# Patient Record
Sex: FEMALE | Race: WHITE | NOT HISPANIC OR LATINO | Employment: PART TIME | URBAN - METROPOLITAN AREA
[De-identification: names, ages, dates, MRNs, and addresses within clinical notes are randomized per-mention and may not be internally consistent; named-entity substitution may affect disease eponyms.]

---

## 2021-02-11 ENCOUNTER — IMMUNIZATION (OUTPATIENT)
Dept: NURSING | Facility: CLINIC | Age: 31
End: 2021-02-11
Payer: COMMERCIAL

## 2021-02-11 PROCEDURE — 91300 PR COVID VAC PFIZER DIL RECON 30 MCG/0.3 ML IM: CPT

## 2021-02-11 PROCEDURE — 0001A PR COVID VAC PFIZER DIL RECON 30 MCG/0.3 ML IM: CPT

## 2021-03-04 ENCOUNTER — IMMUNIZATION (OUTPATIENT)
Dept: NURSING | Facility: CLINIC | Age: 31
End: 2021-03-04
Payer: COMMERCIAL

## 2021-03-04 PROCEDURE — 91300 PR COVID VAC PFIZER DIL RECON 30 MCG/0.3 ML IM: CPT

## 2021-03-04 PROCEDURE — 0002A PR COVID VAC PFIZER DIL RECON 30 MCG/0.3 ML IM: CPT

## 2021-03-07 ENCOUNTER — HEALTH MAINTENANCE LETTER (OUTPATIENT)
Age: 31
End: 2021-03-07

## 2021-08-23 ENCOUNTER — HOSPITAL ENCOUNTER (EMERGENCY)
Facility: CLINIC | Age: 31
Discharge: HOME | End: 2021-08-23
Attending: NURSE PRACTITIONER | Admitting: NURSE PRACTITIONER
Payer: COMMERCIAL

## 2021-08-23 ENCOUNTER — TELEPHONE (OUTPATIENT)
Dept: FAMILY MEDICINE | Facility: CLINIC | Age: 31
End: 2021-08-23

## 2021-08-23 VITALS
HEART RATE: 73 BPM | SYSTOLIC BLOOD PRESSURE: 119 MMHG | TEMPERATURE: 98.6 F | BODY MASS INDEX: 23.7 KG/M2 | DIASTOLIC BLOOD PRESSURE: 82 MMHG | OXYGEN SATURATION: 98 % | WEIGHT: 160 LBS | HEIGHT: 69 IN | RESPIRATION RATE: 15 BRPM

## 2021-08-23 DIAGNOSIS — Z20.9 EXPOSURE TO BAT WITHOUT KNOWN BITE: ICD-10-CM

## 2021-08-23 PROCEDURE — 90675 RABIES VACCINE IM: CPT | Performed by: NURSE PRACTITIONER

## 2021-08-23 PROCEDURE — 96372 THER/PROPH/DIAG INJ SC/IM: CPT | Performed by: NURSE PRACTITIONER

## 2021-08-23 PROCEDURE — 99284 EMERGENCY DEPT VISIT MOD MDM: CPT | Mod: 25

## 2021-08-23 PROCEDURE — 90375 RABIES IG IM/SC: CPT | Performed by: NURSE PRACTITIONER

## 2021-08-23 PROCEDURE — 90471 IMMUNIZATION ADMIN: CPT | Performed by: NURSE PRACTITIONER

## 2021-08-23 PROCEDURE — 250N000011 HC RX IP 250 OP 636: Performed by: NURSE PRACTITIONER

## 2021-08-23 RX ORDER — METHYLPREDNISOLONE SODIUM SUCCINATE 125 MG/2ML
125 INJECTION, POWDER, LYOPHILIZED, FOR SOLUTION INTRAMUSCULAR; INTRAVENOUS
Status: CANCELLED
Start: 2021-08-23

## 2021-08-23 RX ORDER — ALBUTEROL SULFATE 0.83 MG/ML
2.5 SOLUTION RESPIRATORY (INHALATION)
Status: CANCELLED | OUTPATIENT
Start: 2021-08-23

## 2021-08-23 RX ORDER — ALBUTEROL SULFATE 90 UG/1
1-2 AEROSOL, METERED RESPIRATORY (INHALATION)
Status: CANCELLED
Start: 2021-08-23

## 2021-08-23 RX ORDER — EPINEPHRINE 1 MG/ML
0.3 INJECTION, SOLUTION, CONCENTRATE INTRAVENOUS EVERY 5 MIN PRN
Status: CANCELLED | OUTPATIENT
Start: 2021-08-23

## 2021-08-23 RX ORDER — NALOXONE HYDROCHLORIDE 0.4 MG/ML
0.2 INJECTION, SOLUTION INTRAMUSCULAR; INTRAVENOUS; SUBCUTANEOUS
Status: CANCELLED | OUTPATIENT
Start: 2021-08-23

## 2021-08-23 RX ORDER — MEPERIDINE HYDROCHLORIDE 25 MG/ML
25 INJECTION INTRAMUSCULAR; INTRAVENOUS; SUBCUTANEOUS EVERY 30 MIN PRN
Status: CANCELLED | OUTPATIENT
Start: 2021-08-23

## 2021-08-23 RX ORDER — DIPHENHYDRAMINE HYDROCHLORIDE 50 MG/ML
50 INJECTION INTRAMUSCULAR; INTRAVENOUS
Status: CANCELLED
Start: 2021-08-23

## 2021-08-23 RX ADMIN — RABIES IMMUNE GLOBULIN (HUMAN) 1440 UNITS: 300 INJECTION, SOLUTION INFILTRATION; INTRAMUSCULAR at 15:57

## 2021-08-23 RX ADMIN — RABIES VACCINE 1 ML: KIT at 15:54

## 2021-08-23 ASSESSMENT — MIFFLIN-ST. JEOR: SCORE: 1505.14
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
## 2021-08-23 NOTE — TELEPHONE ENCOUNTER
Reason for call:  Other   Patient called regarding (reason for call): call back    Additional comments: per hospital staff, pt needs orders for rabies vaccines.    Phone number to reach patient:  Home number on file [phone] (home)    Best Time:  Anytime     Can we leave a detailed message on this number?  NO    Travel screening: Not Applicable.

## 2021-08-23 NOTE — ED NOTES
I assisted this patient with follow up rabies vaccines.  I explained to her to call the [facility] to ensure they have the vaccine for Thursday and Monday.  She agreed.  I explained that she will need to have a covid test and the results no more that 4 days prior to day 14 vaccine at the  Infusion center. I informed this patient that if any of this plan doesn't work she will need to return to the ER for her vaccines.  This patient agreed.

## 2021-08-23 NOTE — DISCHARGE INSTRUCTIONS
You must get covid tested and have results between Friday September 3--Monday September 6 in order to go to the Outpatient Infusion Center for your day 14 rabies vaccine.  If this isn't possible you will need to return to the ER on Monday September 6th for this vaccine.    You will need follow up Rabies Vaccine on August 26, August 30th and September 6th.

## 2021-08-24 NOTE — TELEPHONE ENCOUNTER
We typically do not keep Rabies vaccine in clinic. Due do a patient not needing it we do have two doses in clinic.  If more need to be ordered please let me know. Im not sure on the Rabies series. Pt had dose 1 8/23/21. Has follow up appt 8/26/21.    Varinder GUIDO, YARA

## 2021-08-24 NOTE — TELEPHONE ENCOUNTER
Spoke with Varinder we have two rabies vaccines in clinic. Called to let pt know that he will order it at time of appt and to proceed with both appts as scheduled in next week, but mail box was full will call again later.   Soco Toussaint

## 2021-08-24 NOTE — TELEPHONE ENCOUNTER
Varinder please order third dose for pt to do follow up for 9/7. Thank you so much.   Soco Toussaint

## 2021-08-26 ENCOUNTER — OFFICE VISIT (OUTPATIENT)
Dept: FAMILY MEDICINE | Facility: CLINIC | Age: 31
End: 2021-08-26
Payer: COMMERCIAL

## 2021-08-26 VITALS
BODY MASS INDEX: 23.18 KG/M2 | OXYGEN SATURATION: 98 % | TEMPERATURE: 97.1 F | SYSTOLIC BLOOD PRESSURE: 110 MMHG | WEIGHT: 157 LBS | DIASTOLIC BLOOD PRESSURE: 72 MMHG | RESPIRATION RATE: 16 BRPM | HEART RATE: 59 BPM

## 2021-08-26 DIAGNOSIS — Z23 NEED FOR VACCINATION: Primary | ICD-10-CM

## 2021-08-26 DIAGNOSIS — Z20.9 EXPOSURE TO BAT WITHOUT KNOWN BITE: ICD-10-CM

## 2021-08-26 PROCEDURE — 90471 IMMUNIZATION ADMIN: CPT | Performed by: FAMILY MEDICINE

## 2021-08-26 PROCEDURE — 90675 RABIES VACCINE IM: CPT | Performed by: FAMILY MEDICINE

## 2021-08-26 PROCEDURE — 99202 OFFICE O/P NEW SF 15 MIN: CPT | Mod: 25 | Performed by: FAMILY MEDICINE

## 2021-08-26 RX ORDER — DROSPIRENONE AND ETHINYL ESTRADIOL 0.02-3(28)
1 KIT ORAL
COMMUNITY
Start: 2021-07-27

## 2021-08-26 ASSESSMENT — PAIN SCALES - GENERAL: PAINLEVEL: NO PAIN (0)

## 2021-08-26 NOTE — PROGRESS NOTES
Assessment & Plan     Exposure to bat without known bite    - Infusion Appointment Request    Need for vaccination    - C IMOVAX RABIES VACCINE, IM                 No follow-ups on file.    Lemuel Hernández MD  [facility] MARLEN Joyce is a 31 year old who presents for the following health issues     HPI     ED/UC Followup:    Facility:  [facility]  Date of visit: 8/23/2021  Reason for visit: Exposure to bat without known bite  Current Status: stable, here for 2nd rabies vaccine       No conerns    Review of Systems   Constitutional, HEENT, cardiovascular, pulmonary, gi and gu systems are negative, except as otherwise noted.      Objective    There were no vitals taken for this visit.  There is no height or weight on file to calculate BMI.  Physical Exam   GENERAL: healthy, alert and no distress  NECK: no adenopathy, no asymmetry, masses, or scars and thyroid normal to palpation  RESP: lungs clear to auscultation - no rales, rhonchi or wheezes  CV: regular rate and rhythm, normal S1 S2, no S3 or S4, no murmur, click or rub, no peripheral edema and peripheral pulses strong  ABDOMEN: soft, nontender, no hepatosplenomegaly, no masses and bowel sounds normal  MS: no gross musculoskeletal defects noted, no edema

## 2021-08-30 ENCOUNTER — ALLIED HEALTH/NURSE VISIT (OUTPATIENT)
Dept: FAMILY MEDICINE | Facility: CLINIC | Age: 31
End: 2021-08-30
Payer: COMMERCIAL

## 2021-08-30 DIAGNOSIS — Z20.9 EXPOSURE TO BAT WITHOUT KNOWN BITE: ICD-10-CM

## 2021-08-30 PROCEDURE — 90675 RABIES VACCINE IM: CPT

## 2021-08-30 PROCEDURE — 90471 IMMUNIZATION ADMIN: CPT

## 2021-08-30 RX ORDER — ULIPRISTAL ACETATE 30 MG/1
TABLET ORAL
COMMUNITY
Start: 2021-08-07 | End: 2021-11-01

## 2021-08-30 RX ORDER — CEPHALEXIN 500 MG/1
CAPSULE ORAL
COMMUNITY
Start: 2021-07-27 | End: 2021-11-01

## 2021-08-30 NOTE — PROGRESS NOTES
Patient has been seen for nurse only regarding rabies vaccine. Completed #3 today 08/30/21. Next dose per patient is scheduled 09/07/21.    Patient tolerated well with no side effects.  Documented in immunization tab.    Hawa LOPEZ CMA

## 2021-09-07 ENCOUNTER — ALLIED HEALTH/NURSE VISIT (OUTPATIENT)
Dept: FAMILY MEDICINE | Facility: CLINIC | Age: 31
End: 2021-09-07
Payer: COMMERCIAL

## 2021-09-07 DIAGNOSIS — Z23 NEED FOR VACCINATION: Primary | ICD-10-CM

## 2021-09-07 DIAGNOSIS — Z20.9 EXPOSURE TO BAT WITHOUT KNOWN BITE: ICD-10-CM

## 2021-09-07 PROCEDURE — 99207 PR NO CHARGE NURSE ONLY: CPT

## 2021-09-07 PROCEDURE — 90675 RABIES VACCINE IM: CPT

## 2021-09-07 PROCEDURE — 90471 IMMUNIZATION ADMIN: CPT

## 2021-10-11 ENCOUNTER — HEALTH MAINTENANCE LETTER (OUTPATIENT)
Age: 31
End: 2021-10-11

## 2021-11-01 ENCOUNTER — IMMUNIZATION (OUTPATIENT)
Dept: NURSING | Facility: CLINIC | Age: 31
End: 2021-11-01
Payer: COMMERCIAL

## 2021-11-01 ENCOUNTER — E-VISIT (OUTPATIENT)
Dept: URGENT CARE | Facility: CLINIC | Age: 31
End: 2021-11-01
Payer: COMMERCIAL

## 2021-11-01 DIAGNOSIS — L20.9 ATOPIC DERMATITIS, UNSPECIFIED TYPE: Primary | ICD-10-CM

## 2021-11-01 PROCEDURE — 91300 PR COVID VAC PFIZER DIL RECON 30 MCG/0.3 ML IM: CPT

## 2021-11-01 PROCEDURE — 0004A PR COVID VAC PFIZER DIL RECON 30 MCG/0.3 ML IM: CPT

## 2021-11-01 PROCEDURE — 90471 IMMUNIZATION ADMIN: CPT

## 2021-11-01 PROCEDURE — 99421 OL DIG E/M SVC 5-10 MIN: CPT | Performed by: NURSE PRACTITIONER

## 2021-11-01 PROCEDURE — 90686 IIV4 VACC NO PRSV 0.5 ML IM: CPT

## 2021-11-01 RX ORDER — TRIAMCINOLONE ACETONIDE 1 MG/G
OINTMENT TOPICAL 2 TIMES DAILY
Qty: 80 G | Refills: 1 | Status: SHIPPED | OUTPATIENT
Start: 2021-11-01 | End: 2022-02-15

## 2021-11-02 NOTE — PATIENT INSTRUCTIONS
Dear Maria Del Carmen Sheikh    After reviewing your responses, I've been able to diagnose you with eczema, which is a common skin condition that causes small fluid-filled blisters or bumps to appear on your skin. The exact cause is unknown but your risk may increase if you have allergies, smoke, or have other skin conditions. Some foods such as mushrooms, chocolate and coffee also are known potential triggers.     Based on your responses, I have prescribed steroid ointment and Eucerin cream to treat this. Please follow the instructions on the medication. If you experience irritation of your skin, new rash, or any other new symptoms, you should stop using this medication and contact your primary care provider.     If this treatment does not work for you or you will run out of refills, please plan to follow- up with your primary care provider to set refills for a longer period of time or to try other options.     Things you can do to help prevent this:     Do not scratch your rash.Bacteria from your fingernails may enter your open sores during scratching and cause an infection.     Use moisturizes or emollients, such as petroleum jelly.These help relieve itching and help prevent bacteria from getting in your sores. If you have a doctor's order for medicated cream, apply that first. Then apply the moisturizer or emollient on top.    Thanks for choosing us as your health care partner,    Jenifer Patel, CNP

## 2021-12-17 ENCOUNTER — E-VISIT (OUTPATIENT)
Dept: URGENT CARE | Facility: URGENT CARE | Age: 31
End: 2021-12-17
Payer: COMMERCIAL
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
## 2021-12-17 DIAGNOSIS — T14.8XXA ABRASION: ICD-10-CM

## 2021-12-17 DIAGNOSIS — S51.859A: Primary | ICD-10-CM

## 2021-12-17 PROCEDURE — 99421 OL DIG E/M SVC 5-10 MIN: CPT | Performed by: NURSE PRACTITIONER

## 2021-12-17 NOTE — PATIENT INSTRUCTIONS
Dear Maria Del Carmen Sheikh    This looks like a typical bite that is not infected.  The bruising is normal and the small scrapes you have on top of the skin should be treated with a small amount of antibiotic ointment once or twice a day for the next several days.  If you develop redness or drainage that is not clear from these areas or the arm becomes significantly more swollen please go to the urgent care to be seen in person.    Thanks for choosing us as your health care partner,    June Grove CNP

## 2022-01-28 ENCOUNTER — VIRTUAL VISIT (OUTPATIENT)
Dept: FAMILY MEDICINE | Facility: CLINIC | Age: 32
End: 2022-01-28
Payer: COMMERCIAL

## 2022-01-28 DIAGNOSIS — R30.0 DYSURIA: Primary | ICD-10-CM

## 2022-01-28 LAB
ALBUMIN UR-MCNC: NEGATIVE MG/DL
APPEARANCE UR: CLEAR
BACTERIA #/AREA URNS HPF: ABNORMAL /HPF
BILIRUB UR QL STRIP: NEGATIVE
COLOR UR AUTO: YELLOW
GLUCOSE UR STRIP-MCNC: NEGATIVE MG/DL
HGB UR QL STRIP: NEGATIVE
KETONES UR STRIP-MCNC: NEGATIVE MG/DL
LEUKOCYTE ESTERASE UR QL STRIP: NEGATIVE
MUCOUS THREADS #/AREA URNS LPF: PRESENT /LPF
NITRATE UR QL: NEGATIVE
PH UR STRIP: 8.5 [PH] (ref 5–7)
RBC #/AREA URNS AUTO: ABNORMAL /HPF
SP GR UR STRIP: 1.02 (ref 1–1.03)
SQUAMOUS #/AREA URNS AUTO: ABNORMAL /LPF
UROBILINOGEN UR STRIP-ACNC: 0.2 E.U./DL
WBC #/AREA URNS AUTO: ABNORMAL /HPF

## 2022-01-28 PROCEDURE — 81001 URINALYSIS AUTO W/SCOPE: CPT | Performed by: PHYSICIAN ASSISTANT

## 2022-01-28 PROCEDURE — 99213 OFFICE O/P EST LOW 20 MIN: CPT | Mod: 95 | Performed by: PHYSICIAN ASSISTANT

## 2022-01-28 RX ORDER — SULFAMETHOXAZOLE/TRIMETHOPRIM 800-160 MG
1 TABLET ORAL 2 TIMES DAILY
Qty: 14 TABLET | Refills: 0 | Status: SHIPPED | OUTPATIENT
Start: 2022-01-28 | End: 2022-02-15

## 2022-01-28 NOTE — RESULT ENCOUNTER NOTE
Dear Maria Del Carmen    Your test results are attached, feel free to contact me via [facility]     With your symptoms and some bacteria/mucus seen on your urine test, I think it iw worth a short course of antibiotic to see if this will resolve your symptoms.  I have called that into your pharmacy.  Let me know early next week if you are not feeling better.    Kael Padilla PA-C     normal...

## 2022-01-28 NOTE — PROGRESS NOTES
Maria Del Carmen is a 31 year old who is being evaluated via a billable telephone visit.      What phone number would you like to be contacted at? [phone]  How would you like to obtain your AVS? MyChart    Assessment & Plan     Dysuria  UA is a bit borderline, but with symptoms will treat.  Follow up next week if symptoms persist or worsen   - UA with Microscopic reflex to Culture - lab collect; Future  - UA with Microscopic reflex to Culture - lab collect  - Urine Microscopic  - sulfamethoxazole-trimethoprim (BACTRIM DS) 800-160 MG tablet; Take 1 tablet by mouth 2 times daily                 No follow-ups on file.    JANET Luna [facility]   Maria Del Carmen is a 31 year old who presents for the following health issues     HPI     Please abstract the following data from this visit with this patient into the appropriate field in Epic:    Tests that can be patient reported without a hard copy:    Pap smear done on this date: 08/01/2019 (approximately), by this group: [facility], results were normal.         Genitourinary - Female  Onset/Duration: 3 days   Description:   Painful urination (Dysuria): no           Frequency: YES  Blood in urine (Hematuria): no  Delay in urine (Hesitency): YES  Intensity: moderate  Progression of Symptoms:  worsening  Accompanying Signs & Symptoms:  Fever/chills: no  Flank pain: YES  Nausea and vomiting: no  Vaginal symptoms: none  Abdominal/Pelvic Pain: YES- abdominal pain   History:   History of frequent UTI s: YES- as a child   History of kidney stones: no  Sexually Active: YES  Possibility of pregnancy: No  Precipitating or alleviating factors: None  Therapies tried and outcome: Cranberry juice prn (contraindicated in Coumadin patients), Increase fluid intake and  tylenol/sudafed          Review of Systems   Constitutional, HEENT, cardiovascular, pulmonary, gi and gu systems are negative, except as otherwise noted.      Objective            Vitals:  No vitals were obtained today due to virtual visit.    Physical Exam   alert and no distress  PSYCH: Alert and oriented times 3; coherent speech, normal   rate and volume, able to articulate logical thoughts, able   to abstract reason, no tangential thoughts, no hallucinations   or delusions  Her affect is normal  RESP: No cough, no audible wheezing, able to talk in full sentences  Remainder of exam unable to be completed due to telephone visits    Results for orders placed or performed in visit on 01/28/22 (from the past 24 hour(s))   UA with Microscopic reflex to Culture - lab collect    Specimen: Urine, Midstream   Result Value Ref Range    Color Urine Yellow Colorless, Straw, Light Yellow, Yellow    Appearance Urine Clear Clear    Glucose Urine Negative Negative mg/dL    Bilirubin Urine Negative Negative    Ketones Urine Negative Negative mg/dL    Specific Gravity Urine 1.020 1.003 - 1.035    Blood Urine Negative Negative    pH Urine 8.5 (H) 5.0 - 7.0    Protein Albumin Urine Negative Negative mg/dL    Urobilinogen Urine 0.2 0.2, 1.0 E.U./dL    Nitrite Urine Negative Negative    Leukocyte Esterase Urine Negative Negative   Urine Microscopic   Result Value Ref Range    Bacteria Urine Few (A) None Seen /HPF    RBC Urine 0-2 0-2 /HPF /HPF    WBC Urine 0-5 0-5 /HPF /HPF    Squamous Epithelials Urine Few (A) None Seen /LPF    Mucus Urine Present (A) None Seen /LPF    Narrative    Urine Culture not indicated               Phone call duration: 9 minutes

## 2022-02-07 ENCOUNTER — E-VISIT (OUTPATIENT)
Dept: URGENT CARE | Facility: URGENT CARE | Age: 32
End: 2022-02-07
Payer: COMMERCIAL

## 2022-02-07 DIAGNOSIS — L20.84 INTRINSIC ATOPIC DERMATITIS: Primary | ICD-10-CM

## 2022-02-07 PROCEDURE — 99421 OL DIG E/M SVC 5-10 MIN: CPT | Performed by: PHYSICIAN ASSISTANT

## 2022-02-07 RX ORDER — TRIAMCINOLONE ACETONIDE 1 MG/G
OINTMENT TOPICAL 2 TIMES DAILY
Qty: 30 G | Refills: 1 | Status: SHIPPED | OUTPATIENT
Start: 2022-02-07 | End: 2022-02-15

## 2022-02-15 ENCOUNTER — OFFICE VISIT (OUTPATIENT)
Dept: FAMILY MEDICINE | Facility: CLINIC | Age: 32
End: 2022-02-15
Payer: COMMERCIAL

## 2022-02-15 VITALS
BODY MASS INDEX: 23.04 KG/M2 | SYSTOLIC BLOOD PRESSURE: 120 MMHG | OXYGEN SATURATION: 100 % | HEART RATE: 60 BPM | WEIGHT: 156 LBS | DIASTOLIC BLOOD PRESSURE: 80 MMHG

## 2022-02-15 DIAGNOSIS — L30.9 HAND DERMATITIS: ICD-10-CM

## 2022-02-15 DIAGNOSIS — L20.84 INTRINSIC ATOPIC DERMATITIS: ICD-10-CM

## 2022-02-15 DIAGNOSIS — R87.619 ABNORMAL CERVICAL PAPANICOLAOU SMEAR, UNSPECIFIED ABNORMAL PAP FINDING: ICD-10-CM

## 2022-02-15 DIAGNOSIS — Z12.4 PAP SMEAR FOR CERVICAL CANCER SCREENING: Primary | ICD-10-CM

## 2022-02-15 PROCEDURE — G0123 SCREEN CERV/VAG THIN LAYER: HCPCS | Performed by: PHYSICIAN ASSISTANT

## 2022-02-15 PROCEDURE — 87624 HPV HI-RISK TYP POOLED RSLT: CPT | Performed by: PHYSICIAN ASSISTANT

## 2022-02-15 PROCEDURE — 99213 OFFICE O/P EST LOW 20 MIN: CPT | Performed by: PHYSICIAN ASSISTANT

## 2022-02-15 RX ORDER — TRIAMCINOLONE ACETONIDE 1 MG/G
OINTMENT TOPICAL 2 TIMES DAILY
Qty: 30 G | Refills: 1 | Status: SHIPPED | OUTPATIENT
Start: 2022-02-15

## 2022-02-15 NOTE — LETTER
February 15, 2022      Maria Del Carmen Sheikh  [address]  [place] [address]        To Whom It May Concern:    Maria Del Carmen Sheikh was seen today for an office visit.  She is having a skin reaction on her hands, possibly related to the gloves she is using while at work.  She is being referred to a Dermatologist.  In the meantime, please provide her with hypoallergenic gloves to use as needed while at work.    Thank you for your attention to this matter.          Sincerely,        Ghazal Sena PA-C
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
## 2022-02-17 LAB
HUMAN PAPILLOMA VIRUS 16 DNA: NEGATIVE
HUMAN PAPILLOMA VIRUS 18 DNA: NEGATIVE
HUMAN PAPILLOMA VIRUS FINAL DIAGNOSIS: NORMAL
HUMAN PAPILLOMA VIRUS OTHER HR: NEGATIVE

## 2022-02-23 PROBLEM — R87.612 PAPANICOLAOU SMEAR OF CERVIX WITH LOW GRADE SQUAMOUS INTRAEPITHELIAL LESION (LGSIL): Status: ACTIVE | Noted: 2022-02-23

## 2022-02-23 LAB
BKR LAB AP GYN ADEQUACY: NORMAL
BKR LAB AP GYN INTERPRETATION: NORMAL
BKR LAB AP HPV REFLEX: NORMAL
BKR LAB AP LMP: NORMAL
BKR LAB AP PREVIOUS ABNL DX: NORMAL
BKR LAB AP PREVIOUS ABNORMAL: NORMAL
PATH REPORT.COMMENTS IMP SPEC: NORMAL
PATH REPORT.COMMENTS IMP SPEC: NORMAL
PATH REPORT.RELEVANT HX SPEC: NORMAL

## 2022-03-27 ENCOUNTER — HEALTH MAINTENANCE LETTER (OUTPATIENT)
Age: 32
End: 2022-03-27

## 2022-09-25 ENCOUNTER — HEALTH MAINTENANCE LETTER (OUTPATIENT)
Age: 32
End: 2022-09-25

## 2022-11-10 ENCOUNTER — OFFICE VISIT (OUTPATIENT)
Dept: URGENT CARE | Facility: URGENT CARE | Age: 32
End: 2022-11-10

## 2022-11-10 VITALS
TEMPERATURE: 97.6 F | OXYGEN SATURATION: 98 % | SYSTOLIC BLOOD PRESSURE: 130 MMHG | HEART RATE: 64 BPM | DIASTOLIC BLOOD PRESSURE: 85 MMHG

## 2022-11-10 DIAGNOSIS — S09.90XA CLOSED HEAD INJURY, INITIAL ENCOUNTER: Primary | ICD-10-CM

## 2022-11-10 PROCEDURE — 99213 OFFICE O/P EST LOW 20 MIN: CPT | Performed by: FAMILY MEDICINE
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
## 2022-11-10 NOTE — LETTER
REPORT OF WORK ABILITY    [address]  [address]  [phone]    PATIENT DATA    Employee Name: Maria Del Carmen Sheikh      : 1990     SS#: [id number]    Work related injury: Yes  Employer at time of injury: [employer]  Employer contact & phone:   Employed elsewhere? No  Workers' Compensation Carrier/Managed Care Plan:       Today's date: 11/10/2022  Date of injury: November 10, 2022   Date of first visit: November 10, 2022     PROVIDER EVALUATION: Please fill in as needed.  Please give copy to employee for employer.    1. Diagnosis: closed head injury    2. Treatment: monitoring, ice/tylenol.  3. Medication: tylenol  NOTE: When ordering a medication, [place] Rules require Work Comp or WC on prescriptions.    4. Return to work date: 2022    ** WITH RESTRICTIONS? No restrictions    RESTRICTIONS: Unlimited unless listed.  Restrictions apply to home and leisure also.  If work restrictions is not available, the employee is totally disabled.    Maximum Medical Improvement (Date):    Any Permanent Partial Disability? Deferred to future exam/consult.    Provider comments:      Medical Examiner: Ian Wen MD           License or registration: [id number]    Next appointment: 4 days if her symptoms are not resolving.     CC: Employer, Managed Care Plan/Payor, Patient

## 2023-05-08 ENCOUNTER — HEALTH MAINTENANCE LETTER (OUTPATIENT)
Age: 33
End: 2023-05-08

## 2024-05-11 ENCOUNTER — HEALTH MAINTENANCE LETTER (OUTPATIENT)
Age: 34
End: 2024-05-11

## 2024-11-05 ENCOUNTER — OFFICE VISIT (OUTPATIENT)
Dept: URGENT CARE | Facility: URGENT CARE | Age: 34
End: 2024-11-05
Payer: COMMERCIAL

## 2024-11-05 VITALS
DIASTOLIC BLOOD PRESSURE: 86 MMHG | OXYGEN SATURATION: 98 % | TEMPERATURE: 98.5 F | RESPIRATION RATE: 16 BRPM | BODY MASS INDEX: 27.76 KG/M2 | WEIGHT: 188 LBS | HEART RATE: 90 BPM | SYSTOLIC BLOOD PRESSURE: 128 MMHG

## 2024-11-05 DIAGNOSIS — N93.9 ABNORMAL UTERINE BLEEDING: Primary | ICD-10-CM

## 2024-11-05 DIAGNOSIS — F32.A ANXIETY AND DEPRESSION: ICD-10-CM

## 2024-11-05 DIAGNOSIS — F41.9 ANXIETY AND DEPRESSION: ICD-10-CM

## 2024-11-05 LAB
ALBUMIN UR-MCNC: NEGATIVE MG/DL
ANION GAP SERPL CALCULATED.3IONS-SCNC: 14 MMOL/L (ref 7–15)
APPEARANCE UR: CLEAR
BASOPHILS # BLD AUTO: 0.1 10E3/UL (ref 0–0.2)
BASOPHILS NFR BLD AUTO: 1 %
BILIRUB UR QL STRIP: NEGATIVE
BUN SERPL-MCNC: 6.8 MG/DL (ref 6–20)
CALCIUM SERPL-MCNC: 8.9 MG/DL (ref 8.8–10.4)
CHLORIDE SERPL-SCNC: 105 MMOL/L (ref 98–107)
COLOR UR AUTO: YELLOW
CREAT SERPL-MCNC: 0.61 MG/DL (ref 0.51–0.95)
EGFRCR SERPLBLD CKD-EPI 2021: >90 ML/MIN/1.73M2
EOSINOPHIL # BLD AUTO: 0.3 10E3/UL (ref 0–0.7)
EOSINOPHIL NFR BLD AUTO: 7 %
ERYTHROCYTE [DISTWIDTH] IN BLOOD BY AUTOMATED COUNT: 12.1 % (ref 10–15)
GLUCOSE SERPL-MCNC: 114 MG/DL (ref 70–99)
GLUCOSE UR STRIP-MCNC: NEGATIVE MG/DL
HCG UR QL: NEGATIVE
HCO3 SERPL-SCNC: 25 MMOL/L (ref 22–29)
HCT VFR BLD AUTO: 43 % (ref 35–47)
HGB BLD-MCNC: 14.5 G/DL (ref 11.7–15.7)
HGB UR QL STRIP: ABNORMAL
IMM GRANULOCYTES # BLD: 0 10E3/UL
IMM GRANULOCYTES NFR BLD: 0 %
KETONES UR STRIP-MCNC: NEGATIVE MG/DL
LEUKOCYTE ESTERASE UR QL STRIP: NEGATIVE
LYMPHOCYTES # BLD AUTO: 1.9 10E3/UL (ref 0.8–5.3)
LYMPHOCYTES NFR BLD AUTO: 45 %
MCH RBC QN AUTO: 33.3 PG (ref 26.5–33)
MCHC RBC AUTO-ENTMCNC: 33.7 G/DL (ref 31.5–36.5)
MCV RBC AUTO: 99 FL (ref 78–100)
MONOCYTES # BLD AUTO: 0.2 10E3/UL (ref 0–1.3)
MONOCYTES NFR BLD AUTO: 5 %
NEUTROPHILS # BLD AUTO: 1.7 10E3/UL (ref 1.6–8.3)
NEUTROPHILS NFR BLD AUTO: 42 %
NITRATE UR QL: NEGATIVE
PH UR STRIP: 6 [PH] (ref 5–7)
PLATELET # BLD AUTO: 284 10E3/UL (ref 150–450)
POTASSIUM SERPL-SCNC: 4.1 MMOL/L (ref 3.4–5.3)
RBC # BLD AUTO: 4.36 10E6/UL (ref 3.8–5.2)
RBC #/AREA URNS AUTO: NORMAL /HPF
SODIUM SERPL-SCNC: 144 MMOL/L (ref 135–145)
SP GR UR STRIP: 1.01 (ref 1–1.03)
UROBILINOGEN UR STRIP-ACNC: 0.2 E.U./DL
WBC # BLD AUTO: 4.1 10E3/UL (ref 4–11)
WBC #/AREA URNS AUTO: NORMAL /HPF

## 2024-11-05 PROCEDURE — 99214 OFFICE O/P EST MOD 30 MIN: CPT | Performed by: PHYSICIAN ASSISTANT

## 2024-11-05 PROCEDURE — 80048 BASIC METABOLIC PNL TOTAL CA: CPT | Performed by: PHYSICIAN ASSISTANT

## 2024-11-05 PROCEDURE — 81025 URINE PREGNANCY TEST: CPT | Performed by: PHYSICIAN ASSISTANT

## 2024-11-05 PROCEDURE — 85025 COMPLETE CBC W/AUTO DIFF WBC: CPT | Performed by: PHYSICIAN ASSISTANT

## 2024-11-05 PROCEDURE — 36415 COLL VENOUS BLD VENIPUNCTURE: CPT | Performed by: PHYSICIAN ASSISTANT

## 2024-11-05 PROCEDURE — 81001 URINALYSIS AUTO W/SCOPE: CPT | Performed by: PHYSICIAN ASSISTANT
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
## 2024-11-07 ENCOUNTER — VIRTUAL VISIT (OUTPATIENT)
Dept: FAMILY MEDICINE | Facility: CLINIC | Age: 34
End: 2024-11-07
Payer: COMMERCIAL

## 2024-11-07 DIAGNOSIS — F33.2 SEVERE EPISODE OF RECURRENT MAJOR DEPRESSIVE DISORDER, WITHOUT PSYCHOTIC FEATURES (H): Primary | ICD-10-CM

## 2024-11-07 DIAGNOSIS — F90.2 ATTENTION DEFICIT HYPERACTIVITY DISORDER (ADHD), COMBINED TYPE: ICD-10-CM

## 2024-11-07 DIAGNOSIS — F41.1 GAD (GENERALIZED ANXIETY DISORDER): ICD-10-CM

## 2024-11-07 PROCEDURE — G2211 COMPLEX E/M VISIT ADD ON: HCPCS | Mod: 95

## 2024-11-07 PROCEDURE — 99213 OFFICE O/P EST LOW 20 MIN: CPT | Mod: 95

## 2024-11-07 RX ORDER — ESCITALOPRAM OXALATE 10 MG/1
10 TABLET ORAL DAILY
Qty: 60 TABLET | Refills: 0 | Status: ON HOLD | OUTPATIENT
Start: 2024-11-07

## 2024-11-07 RX ORDER — HYDROXYZINE HYDROCHLORIDE 25 MG/1
25 TABLET, FILM COATED ORAL 3 TIMES DAILY PRN
Qty: 30 TABLET | Refills: 1 | Status: ON HOLD | OUTPATIENT
Start: 2024-11-07

## 2024-11-07 ASSESSMENT — ANXIETY QUESTIONNAIRES
6. BECOMING EASILY ANNOYED OR IRRITABLE: NEARLY EVERY DAY
GAD7 TOTAL SCORE: 21
5. BEING SO RESTLESS THAT IT IS HARD TO SIT STILL: NEARLY EVERY DAY
GAD7 TOTAL SCORE: 21
7. FEELING AFRAID AS IF SOMETHING AWFUL MIGHT HAPPEN: NEARLY EVERY DAY
1. FEELING NERVOUS, ANXIOUS, OR ON EDGE: NEARLY EVERY DAY
4. TROUBLE RELAXING: NEARLY EVERY DAY
IF YOU CHECKED OFF ANY PROBLEMS ON THIS QUESTIONNAIRE, HOW DIFFICULT HAVE THESE PROBLEMS MADE IT FOR YOU TO DO YOUR WORK, TAKE CARE OF THINGS AT HOME, OR GET ALONG WITH OTHER PEOPLE: EXTREMELY DIFFICULT
2. NOT BEING ABLE TO STOP OR CONTROL WORRYING: NEARLY EVERY DAY
3. WORRYING TOO MUCH ABOUT DIFFERENT THINGS: NEARLY EVERY DAY
GAD7 TOTAL SCORE: 21
7. FEELING AFRAID AS IF SOMETHING AWFUL MIGHT HAPPEN: NEARLY EVERY DAY
8. IF YOU CHECKED OFF ANY PROBLEMS, HOW DIFFICULT HAVE THESE MADE IT FOR YOU TO DO YOUR WORK, TAKE CARE OF THINGS AT HOME, OR GET ALONG WITH OTHER PEOPLE?: EXTREMELY DIFFICULT

## 2024-11-07 ASSESSMENT — PATIENT HEALTH QUESTIONNAIRE - PHQ9
10. IF YOU CHECKED OFF ANY PROBLEMS, HOW DIFFICULT HAVE THESE PROBLEMS MADE IT FOR YOU TO DO YOUR WORK, TAKE CARE OF THINGS AT HOME, OR GET ALONG WITH OTHER PEOPLE: EXTREMELY DIFFICULT
SUM OF ALL RESPONSES TO PHQ QUESTIONS 1-9: 22
SUM OF ALL RESPONSES TO PHQ QUESTIONS 1-9: 22

## 2024-11-07 NOTE — PROGRESS NOTES
Maria Del Carmen is a 34 year old who is being evaluated via a billable video visit.    How would you like to obtain your AVS? MyChart  If the video visit is dropped, the invitation should be resent by: Text to cell phone: [phone]  Will anyone else be joining your video visit? No      Assessment & Plan     Severe episode of recurrent major depressive disorder, without psychotic features (H)  PHQ-9 score 22.  History of depression that has not been managed.  Would like to start medication management.  Will start patient on lexapro 10 mg daily.  Reviewed medication side effects.  Denies self harm or suicidal ideation.  Placed referral to individual therapy.  Declined psychiatry referral.  Placed referral to  regarding financial resources.  Follow-up in 4 weeks for symptom review and medication management.  - escitalopram (LEXAPRO) 10 MG tablet; Take 1 tablet (10 mg) by mouth daily.  - Adult Mental Health  Referral; Future  - Primary Care - Care Coordination Referral; Future    BROOKLYN (generalized anxiety disorder)  BROOKLYN score 21.  History of anxiety that has not been managed.  Would like to start medication management.  Will start patient on lexapro 10 mg daily.  Reviewed medication side effects.  Will provide hydroxyzine 25 mg as needed for panic attacks.  Placed referral to individual therapy.  - escitalopram (LEXAPRO) 10 MG tablet; Take 1 tablet (10 mg) by mouth daily.  - Adult Mental Health  Referral; Future  - hydrOXYzine HCl (ATARAX) 25 MG tablet; Take 1 tablet (25 mg) by mouth 3 times daily as needed for itching.  - Primary Care - Care Coordination Referral; Future    Attention deficit hyperactivity disorder (ADHD), combined type  Patient would like to be tested for ADHD.  Informed patient that testing may be scheduled out for sometime, patient understands.    - Adult Mental [facility]  Referral; Future    The longitudinal plan of care for the diagnosis(es)/condition(s) as documented  were addressed during this visit. Due to the added complexity in care, I will continue to support Maria Del Carmen in the subsequent management and with ongoing continuity of care.    Depression Screening Follow Up        11/7/2024    11:06 AM   PHQ   PHQ-9 Total Score 22    Q9: Thoughts of better off dead/self-harm past 2 weeks Not at all        Patient-reported         11/7/2024    11:06 AM   Last PHQ-9   1.  Little interest or pleasure in doing things 3    2.  Feeling down, depressed, or hopeless 3    3.  Trouble falling or staying asleep, or sleeping too much 3    4.  Feeling tired or having little energy 3    5.  Poor appetite or overeating 3    6.  Feeling bad about yourself 3    7.  Trouble concentrating 1    8.  Moving slowly or restless 3    Q9: Thoughts of better off dead/self-harm past 2 weeks 0    PHQ-9 Total Score 22        Patient-reported     Follow Up Actions Taken  Crisis resource information provided in After Visit Summary  Mental Health Referral placed  Started patient on anti-depressant.     Subjective   Maria Del Carmen is a 34 year old, presenting for the following health issues:  RECHECK (Anxiety)      11/7/2024    11:22 AM   Additional Questions   Roomed by [employer]     Video Start Time: 11:35 AM    History of Present Illness       Back Pain:  She presents for follow up of back pain. Patient's back pain is a recurring problem.           Mental Health Follow-up:  Patient presents to follow-up on Depression & Anxiety.Patient's depression since last visit has been:  Better  The patient is not having other symptoms associated with depression.  Patient's anxiety since last visit has been:  Worse  The patient is having other symptoms associated with anxiety.  Any significant life events: relationship concerns and grief or loss  Patient is not feeling anxious or having panic attacks.  Patient has concerns about alcohol or drug use.    Reason for visit:  Anxiety  Symptom onset:  More than a month  Symptom intensity:   Severe  Symptom progression:  Worsening  Had these symptoms before:  Yes  What makes it worse:  Trump  What makes it better:  Music   She is taking medications regularly.     Patient is a 34-year old female presenting for concerns depression and anxiety.  Medical history includes eczema.  Reports history of depression and anxiety but has not been fully addressed prior.  Recent stressors and factors attributing to anxiety and depression include:  Election results    Grief-lost stepfather 10 years ago and felt like she has not grieved properly   Work-recently got a promotion but feels overwhelmed with new responsibilities and work load  Financial- has a good income but feels tight with money, last paycheck all went to rent  Feels like she has a lot of unproccessed feelings and emotions.   Was seen [facility] in May but was told the psychiatric exam would be $800 and could not afford and did not complete.  They had prescribed lexapro 10 mg daily and hydroxyzine as needed.  Patient states there were some issues with filling prescription and not take medication.    Has good family support, very close to her mother.         11/7/2024    11:19 AM   BROOKLYN-7 SCORE   Total Score 21 (severe anxiety)   Total Score 21        Patient-reported     Review of Systems  Review of systems negative otherwise known HPI.      Objective       Vitals:  No vitals were obtained today due to virtual visit.    Physical Exam   GENERAL: alert and no distress  EYES: Eyes grossly normal to inspection.  No discharge or erythema, or obvious scleral/conjunctival abnormalities.  RESP: No audible wheeze, cough, or visible cyanosis.    SKIN: Visible skin clear. No significant rash, abnormal pigmentation or lesions.  NEURO: Cranial nerves grossly intact.  Mentation and speech appropriate for age.  PSYCH: Appropriate affect, tone, and pace of words      Video-Visit Details    Type of service:  Video Visit   Video End Time:11:48 AM  Originating  Location (pt. Location): Home    Distant Location (provider location):  On-site  Platform used for Video Visit: Carisa  Signed Electronically by: ORAL Monaco CNP

## 2024-11-08 ENCOUNTER — HOSPITAL ENCOUNTER (INPATIENT)
Facility: CLINIC | Age: 34
DRG: 885 | End: 2024-11-08
Attending: EMERGENCY MEDICINE | Admitting: PSYCHIATRY & NEUROLOGY
Payer: COMMERCIAL

## 2024-11-08 ENCOUNTER — TELEPHONE (OUTPATIENT)
Dept: BEHAVIORAL HEALTH | Facility: CLINIC | Age: 34
End: 2024-11-08

## 2024-11-08 ENCOUNTER — PATIENT OUTREACH (OUTPATIENT)
Dept: CARE COORDINATION | Facility: CLINIC | Age: 34
End: 2024-11-08
Payer: COMMERCIAL

## 2024-11-08 DIAGNOSIS — F22 PARANOIA (H): ICD-10-CM

## 2024-11-08 DIAGNOSIS — F10.90 ALCOHOL USE DISORDER: ICD-10-CM

## 2024-11-08 DIAGNOSIS — F23 ACUTE PSYCHOSIS (H): ICD-10-CM

## 2024-11-08 DIAGNOSIS — F22 DELUSIONAL IDEAS (H): ICD-10-CM

## 2024-11-08 DIAGNOSIS — F31.10 BIPOLAR I DISORDER, MOST RECENT EPISODE (OR CURRENT) MANIC (H): Primary | ICD-10-CM

## 2024-11-08 LAB
ALBUMIN SERPL BCG-MCNC: 4.7 G/DL (ref 3.5–5.2)
ALCOHOL BREATH TEST: 0 (ref 0–0.01)
ALP SERPL-CCNC: 136 U/L (ref 40–150)
ALT SERPL W P-5'-P-CCNC: 70 U/L (ref 0–50)
AMPHETAMINES UR QL SCN: NORMAL
ANION GAP SERPL CALCULATED.3IONS-SCNC: 14 MMOL/L (ref 7–15)
AST SERPL W P-5'-P-CCNC: 66 U/L (ref 0–45)
BARBITURATES UR QL SCN: NORMAL
BASOPHILS # BLD AUTO: 0 10E3/UL (ref 0–0.2)
BASOPHILS NFR BLD AUTO: 1 %
BENZODIAZ UR QL SCN: NORMAL
BILIRUB SERPL-MCNC: 1 MG/DL
BUN SERPL-MCNC: 8.6 MG/DL (ref 6–20)
BZE UR QL SCN: NORMAL
CALCIUM SERPL-MCNC: 9.4 MG/DL (ref 8.8–10.4)
CANNABINOIDS UR QL SCN: NORMAL
CHLORIDE SERPL-SCNC: 99 MMOL/L (ref 98–107)
CREAT SERPL-MCNC: 0.53 MG/DL (ref 0.51–0.95)
EGFRCR SERPLBLD CKD-EPI 2021: >90 ML/MIN/1.73M2
EOSINOPHIL # BLD AUTO: 0.1 10E3/UL (ref 0–0.7)
EOSINOPHIL NFR BLD AUTO: 1 %
ERYTHROCYTE [DISTWIDTH] IN BLOOD BY AUTOMATED COUNT: 11.7 % (ref 10–15)
ETHANOL SERPL-MCNC: <0.01 G/DL
FENTANYL UR QL: NORMAL
GLUCOSE SERPL-MCNC: 103 MG/DL (ref 70–99)
HCG SERPL QL: NEGATIVE
HCO3 SERPL-SCNC: 23 MMOL/L (ref 22–29)
HCT VFR BLD AUTO: 40.8 % (ref 35–47)
HGB BLD-MCNC: 14.6 G/DL (ref 11.7–15.7)
IMM GRANULOCYTES # BLD: 0 10E3/UL
IMM GRANULOCYTES NFR BLD: 0 %
LYMPHOCYTES # BLD AUTO: 1.4 10E3/UL (ref 0.8–5.3)
LYMPHOCYTES NFR BLD AUTO: 22 %
MAGNESIUM SERPL-MCNC: 2 MG/DL (ref 1.7–2.3)
MCH RBC QN AUTO: 33.7 PG (ref 26.5–33)
MCHC RBC AUTO-ENTMCNC: 35.8 G/DL (ref 31.5–36.5)
MCV RBC AUTO: 94 FL (ref 78–100)
MONOCYTES # BLD AUTO: 0.5 10E3/UL (ref 0–1.3)
MONOCYTES NFR BLD AUTO: 9 %
NEUTROPHILS # BLD AUTO: 4 10E3/UL (ref 1.6–8.3)
NEUTROPHILS NFR BLD AUTO: 67 %
NRBC # BLD AUTO: 0 10E3/UL
NRBC BLD AUTO-RTO: 0 /100
OPIATES UR QL SCN: NORMAL
PCP QUAL URINE (ROCHE): NORMAL
PLATELET # BLD AUTO: 305 10E3/UL (ref 150–450)
POTASSIUM SERPL-SCNC: 3.7 MMOL/L (ref 3.4–5.3)
PROT SERPL-MCNC: 7.6 G/DL (ref 6.4–8.3)
RBC # BLD AUTO: 4.33 10E6/UL (ref 3.8–5.2)
SODIUM SERPL-SCNC: 136 MMOL/L (ref 135–145)
WBC # BLD AUTO: 6.1 10E3/UL (ref 4–11)

## 2024-11-08 PROCEDURE — 82077 ASSAY SPEC XCP UR&BREATH IA: CPT | Performed by: EMERGENCY MEDICINE

## 2024-11-08 PROCEDURE — 86038 ANTINUCLEAR ANTIBODIES: CPT

## 2024-11-08 PROCEDURE — 85025 COMPLETE CBC W/AUTO DIFF WBC: CPT | Performed by: EMERGENCY MEDICINE

## 2024-11-08 PROCEDURE — 85041 AUTOMATED RBC COUNT: CPT | Performed by: EMERGENCY MEDICINE

## 2024-11-08 PROCEDURE — 99285 EMERGENCY DEPT VISIT HI MDM: CPT | Performed by: EMERGENCY MEDICINE

## 2024-11-08 PROCEDURE — 99285 EMERGENCY DEPT VISIT HI MDM: CPT | Mod: 25 | Performed by: EMERGENCY MEDICINE

## 2024-11-08 PROCEDURE — 83735 ASSAY OF MAGNESIUM: CPT | Performed by: EMERGENCY MEDICINE

## 2024-11-08 PROCEDURE — 82040 ASSAY OF SERUM ALBUMIN: CPT | Performed by: EMERGENCY MEDICINE

## 2024-11-08 PROCEDURE — 86618 LYME DISEASE ANTIBODY: CPT

## 2024-11-08 PROCEDURE — 84703 CHORIONIC GONADOTROPIN ASSAY: CPT | Performed by: EMERGENCY MEDICINE

## 2024-11-08 PROCEDURE — 80307 DRUG TEST PRSMV CHEM ANLYZR: CPT | Performed by: EMERGENCY MEDICINE

## 2024-11-08 PROCEDURE — 36415 COLL VENOUS BLD VENIPUNCTURE: CPT | Performed by: EMERGENCY MEDICINE

## 2024-11-08 RX ORDER — ESCITALOPRAM OXALATE 10 MG/1
10 TABLET ORAL DAILY
Status: DISCONTINUED | OUTPATIENT
Start: 2024-11-09 | End: 2024-11-09

## 2024-11-08 RX ORDER — OLANZAPINE 10 MG/1
10 TABLET, ORALLY DISINTEGRATING ORAL 2 TIMES DAILY PRN
Status: DISCONTINUED | OUTPATIENT
Start: 2024-11-08 | End: 2024-11-09

## 2024-11-08 RX ORDER — HYDROXYZINE HYDROCHLORIDE 25 MG/1
25 TABLET, FILM COATED ORAL EVERY 6 HOURS PRN
Status: DISCONTINUED | OUTPATIENT
Start: 2024-11-08 | End: 2024-11-09

## 2024-11-08 RX ORDER — DIAZEPAM 5 MG/1
5-20 TABLET ORAL EVERY 30 MIN PRN
Status: DISCONTINUED | OUTPATIENT
Start: 2024-11-08 | End: 2024-11-09

## 2024-11-08 ASSESSMENT — LIFESTYLE VARIABLES
ANXIETY: NO ANXIETY, AT EASE
HEADACHE, FULLNESS IN HEAD: NOT PRESENT
AGITATION: NORMAL ACTIVITY
VISUAL DISTURBANCES: VERY MILD SENSITIVITY
NAUSEA AND VOMITING: NO NAUSEA AND NO VOMITING
PAROXYSMAL SWEATS: NO SWEAT VISIBLE
TREMOR: NO TREMOR
AUDITORY DISTURBANCES: NOT PRESENT
ORIENTATION AND CLOUDING OF SENSORIUM: ORIENTED AND CAN DO SERIAL ADDITIONS
TACTILE DISTURBANCES: MODERATE ITCHING, PINS AND NEEDLES, BURNING OR NUMBNESS
TOTAL SCORE: 4

## 2024-11-08 ASSESSMENT — ACTIVITIES OF DAILY LIVING (ADL)
ADLS_ACUITY_SCORE: 0

## 2024-11-08 ASSESSMENT — COLUMBIA-SUICIDE SEVERITY RATING SCALE - C-SSRS
1. IN THE PAST MONTH, HAVE YOU WISHED YOU WERE DEAD OR WISHED YOU COULD GO TO SLEEP AND NOT WAKE UP?: NO
2. HAVE YOU ACTUALLY HAD ANY THOUGHTS OF KILLING YOURSELF IN THE PAST MONTH?: NO
6. HAVE YOU EVER DONE ANYTHING, STARTED TO DO ANYTHING, OR PREPARED TO DO ANYTHING TO END YOUR LIFE?: NO
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
## 2024-11-08 NOTE — PROGRESS NOTES
Clinic Care Coordination Contact  [facility]    Clinical Data: Care Coordinator Outreach    Outreach Documentation Number of Outreach Attempt   11/8/2024  12:32 PM 1       Unable to leave a message due to: Voicemail is full.      Plan: Care Coordinator CHW to discuss recent CC referral with patient and offer assistance  Care Coordinator will try to reach patient again in 1-2 business days.    Referral Note:  Placed a primary care scheduling follow-up for patient to discuss anxiety and depression. Patient is not on antidepressants. She will need a PCP to discuss medication management and also Behavioral Health Therapy referral     Order Questions    Question Answer   Reason for Referral: Mental Wellness (Health) (Mental Illness/Chemical Dependency)    Financial Support   Financial Support: Housing   Mental Wellness: [facility] of Behavioral Health Services   Clinical Staff have discussed the Care Coordination Referral with the patient and/or caregiver: Yes     Yessica SAAB  Community Health Worker  [facility] Care Coordination  [place] [email or web address]  Office: [phone]

## 2024-11-08 NOTE — ED TRIAGE NOTES
Pt is here with her uncle - Kael. Brought pt in d/t increase crying, depress, paranoia     Triage Assessment (Adult)       Row Name 11/08/24 1347          Triage Assessment    Airway WDL WDL        Respiratory WDL    Respiratory WDL WDL        Skin Circulation/Temperature WDL    Skin Circulation/Temperature WDL X  acne        Cardiac WDL    Cardiac WDL WDL        Peripheral/Neurovascular WDL    Peripheral Neurovascular WDL WDL        Cognitive/Neuro/Behavioral WDL    Cognitive/Neuro/Behavioral WDL WDL

## 2024-11-09 ENCOUNTER — TELEPHONE (OUTPATIENT)
Dept: BEHAVIORAL HEALTH | Facility: CLINIC | Age: 34
End: 2024-11-09
Payer: COMMERCIAL

## 2024-11-09 PROBLEM — F29 PSYCHOSIS, UNSPECIFIED PSYCHOSIS TYPE (H): Status: ACTIVE | Noted: 2024-11-09

## 2024-11-09 PROBLEM — F23 ACUTE PSYCHOSIS (H): Status: ACTIVE | Noted: 2024-11-09

## 2024-11-09 PROBLEM — F22 PARANOIA (H): Status: ACTIVE | Noted: 2024-11-09

## 2024-11-09 PROBLEM — F22: Status: ACTIVE | Noted: 2024-11-09

## 2024-11-09 LAB
CHOLEST SERPL-MCNC: 201 MG/DL
EST. AVERAGE GLUCOSE BLD GHB EST-MCNC: 105 MG/DL
FOLATE SERPL-MCNC: 14.9 NG/ML (ref 4.6–34.8)
GGT SERPL-CCNC: 70 U/L (ref 5–36)
HBA1C MFR BLD: 5.3 %
HDLC SERPL-MCNC: 128 MG/DL
LDLC SERPL CALC-MCNC: 58 MG/DL
NONHDLC SERPL-MCNC: 73 MG/DL
TRIGL SERPL-MCNC: 74 MG/DL
TSH SERPL DL<=0.005 MIU/L-ACNC: 2.71 UIU/ML (ref 0.3–4.2)
VIT B12 SERPL-MCNC: 380 PG/ML (ref 232–1245)
VIT D+METAB SERPL-MCNC: 17 NG/ML (ref 20–50)

## 2024-11-09 PROCEDURE — 82306 VITAMIN D 25 HYDROXY: CPT

## 2024-11-09 PROCEDURE — 82607 VITAMIN B-12: CPT

## 2024-11-09 PROCEDURE — 250N000013 HC RX MED GY IP 250 OP 250 PS 637

## 2024-11-09 PROCEDURE — 83036 HEMOGLOBIN GLYCOSYLATED A1C: CPT

## 2024-11-09 PROCEDURE — 124N000002 HC R&B MH UMMC

## 2024-11-09 PROCEDURE — 99223 1ST HOSP IP/OBS HIGH 75: CPT | Mod: AI | Performed by: PSYCHIATRY & NEUROLOGY

## 2024-11-09 PROCEDURE — 97150 GROUP THERAPEUTIC PROCEDURES: CPT | Mod: GO

## 2024-11-09 PROCEDURE — 82746 ASSAY OF FOLIC ACID SERUM: CPT

## 2024-11-09 PROCEDURE — 36415 COLL VENOUS BLD VENIPUNCTURE: CPT

## 2024-11-09 PROCEDURE — 84443 ASSAY THYROID STIM HORMONE: CPT

## 2024-11-09 PROCEDURE — 82977 ASSAY OF GGT: CPT

## 2024-11-09 PROCEDURE — 80061 LIPID PANEL: CPT

## 2024-11-09 RX ORDER — MAGNESIUM HYDROXIDE/ALUMINUM HYDROXICE/SIMETHICONE 120; 1200; 1200 MG/30ML; MG/30ML; MG/30ML
30 SUSPENSION ORAL EVERY 4 HOURS PRN
Status: DISCONTINUED | OUTPATIENT
Start: 2024-11-09 | End: 2024-11-19 | Stop reason: HOSPADM

## 2024-11-09 RX ORDER — POLYETHYLENE GLYCOL 3350 17 G/17G
17 POWDER, FOR SOLUTION ORAL DAILY PRN
Status: DISCONTINUED | OUTPATIENT
Start: 2024-11-09 | End: 2024-11-19 | Stop reason: HOSPADM

## 2024-11-09 RX ORDER — OLANZAPINE 10 MG/1
10 TABLET ORAL 3 TIMES DAILY PRN
Status: DISCONTINUED | OUTPATIENT
Start: 2024-11-09 | End: 2024-11-19 | Stop reason: HOSPADM

## 2024-11-09 RX ORDER — CLONIDINE HYDROCHLORIDE 0.1 MG/1
0.1 TABLET ORAL 2 TIMES DAILY PRN
Status: DISCONTINUED | OUTPATIENT
Start: 2024-11-09 | End: 2024-11-11

## 2024-11-09 RX ORDER — HYDROXYZINE HYDROCHLORIDE 25 MG/1
25 TABLET, FILM COATED ORAL EVERY 4 HOURS PRN
Status: DISCONTINUED | OUTPATIENT
Start: 2024-11-09 | End: 2024-11-19 | Stop reason: HOSPADM

## 2024-11-09 RX ORDER — MULTIPLE VITAMINS W/ MINERALS TAB 9MG-400MCG
1 TAB ORAL DAILY
Status: DISCONTINUED | OUTPATIENT
Start: 2024-11-09 | End: 2024-11-19 | Stop reason: HOSPADM

## 2024-11-09 RX ORDER — ESCITALOPRAM OXALATE 10 MG/1
10 TABLET ORAL DAILY
Status: DISCONTINUED | OUTPATIENT
Start: 2024-11-09 | End: 2024-11-09

## 2024-11-09 RX ORDER — OLANZAPINE 10 MG/2ML
10 INJECTION, POWDER, FOR SOLUTION INTRAMUSCULAR 3 TIMES DAILY PRN
Status: DISCONTINUED | OUTPATIENT
Start: 2024-11-09 | End: 2024-11-19 | Stop reason: HOSPADM

## 2024-11-09 RX ORDER — DIAZEPAM 5 MG/1
5-20 TABLET ORAL EVERY 30 MIN PRN
Status: DISCONTINUED | OUTPATIENT
Start: 2024-11-09 | End: 2024-11-11

## 2024-11-09 RX ORDER — FOLIC ACID 1 MG/1
1 TABLET ORAL DAILY
Status: DISCONTINUED | OUTPATIENT
Start: 2024-11-09 | End: 2024-11-19 | Stop reason: HOSPADM

## 2024-11-09 RX ADMIN — HYDROXYZINE HYDROCHLORIDE 25 MG: 25 TABLET, FILM COATED ORAL at 20:37

## 2024-11-09 RX ADMIN — Medication 3 MG: at 20:57

## 2024-11-09 RX ADMIN — FOLIC ACID 1 MG: 1 TABLET ORAL at 08:39

## 2024-11-09 RX ADMIN — THIAMINE HCL TAB 100 MG 100 MG: 100 TAB at 08:39

## 2024-11-09 RX ADMIN — POLYETHYLENE GLYCOL 3350 17 G: 17 POWDER, FOR SOLUTION ORAL at 08:39

## 2024-11-09 RX ADMIN — Medication 1 TABLET: at 08:39

## 2024-11-09 NOTE — ED NOTES
Patient's brother, Scar Sheikh called and patient gave permission for update. Patient's brother states patient drinks excessively hard liquor daily up to 6 or more drinks. CIWA 4. Patient denies SI/HI/SIB/AVH.

## 2024-11-09 NOTE — MEDICATION SCRIBE - ADMISSION MEDICATION HISTORY
Medication Scribe Admission Medication History    Admission medication history is complete. The information provided in this note is only as accurate as the sources available at the time of the update.    Information Source(s): Patient and CareEverywhere/SureScripts via in-person    Pertinent Information: N/A    Changes made to PTA medication list:  Added: None  Deleted: None  Changed: None    Allergies reviewed with patient and updates made in EHR: yes    Medication History Completed By: Mell Cool 11/8/2024 9:43 PM    PTA Med List   Medication Sig Last Dose/Taking    escitalopram (LEXAPRO) 10 MG tablet Take 1 tablet (10 mg) by mouth daily. 11/8/2024 at  1:00 PM    hydrOXYzine HCl (ATARAX) 25 MG tablet Take 1 tablet (25 mg) by mouth 3 times daily as needed for itching. 11/8/2024 at  1:00 PM

## 2024-11-09 NOTE — TELEPHONE ENCOUNTER
Previous Intake staff paged Resident and had CRN on [facility] review pt.     1:25am - Resident accepts pt for [facility] for IP    1:51am - Notified unit of pt in the queue.    1:53am - Notified [facility] of pt placement.     Indicia Completed S.R    R: Patient placement to Station 20/ Dr. Hayden

## 2024-11-09 NOTE — H&P
"  ----------------------------------------------------------------------------------------------------------  [facility]   Psychiatry History and Physical    Name: Maria Del Carmen Sheikh   MRN#: [id number]  Age: 34 year old YOB: 1990    Date of Admission: 11/8/2024  Attending Physician: Monty Hayden MD      Contacts:     Primary Outpatient Psychiatrist: No psychiatrist identified in charts   Primary Physician: Aaliyah Vo APRN, CNP   Ghazal Sena PA-C as Assigned PCP  Jossy Adam PA-C as Physician Assistant (Family Medicine)  Anastacia Carrillo APRN CNP as Nurse Practitioner (OB/Gyn)  Lexii Mustafa CHW as Community Health Worker  Therapist: Last therapist was in May 2020, no current therapist  [facility] : None  Probation/: None  Family Members: Herbert[place]essa -[phone]; Cleve Sheikh [phone]     Chief Concern:     \"I need to get my mental health evaluated\"     History of Present Illness:     Maria Del Carmen Sheikh is a 34 year old female with previous psychiatric diagnoses of AUD, Cannabis use disorder, BROOKLYN, MDD and ADHD  admitted from the ER on 11/09/2024 due to concern for psychosis in the context of psychosocial stressors including work and stress from the recent elections .    ED Assessment:  Maria Del Carmen Sheikh is a 34 year old female who presents to the ER for a psychiatric evaluation.  Patient is brought in by her uncle who provides collateral information.  According the patient she has been having increased paranoia concerns about people carefully tracking the work she has been doing at work including high-profile people from another county that she will not provide further information on, and also stating that President kala Parker is personally watching her and her work.  She states that she lost her job.  Patient's uncle states that this has been ongoing for longer than just a day and she has now had 3 visits to an " emergency department in the last week for psychiatric concerns.  She was started on Lexapro and hydroxyzine which the patient states she started today, but has not noticed any improvement.  The uncle is concerned that the patient is not oriented with reality and has concerns that are grandiose and delusional with paranoia.  She does have a history of this about 6 months ago and was prescribed medications that the she did not start taking.  Patient denies any suicidal ideation, homicidal ideation.  She denies any hallucinations.  She denies any medical complaints of fevers, chills, chest pain, shortness of breath, abdominal pain, nausea, vomiting, or other concerns.     LM/DEC Assessment:  Pt brought to the ED by uncle for mental health evaluation. She is calm and cooperative. Speech is pressured.  She talks at lenght about work stress, promotion a month ago and excessive work and caseload. She has been struggling to manage it all. She is not sleeping more than 3-4 hours per night. She also talks about the stress of the recent presidential election and her dissatisfaction with the results.  Her thoughts appear to be disorganized and paranoid in content. She has begun to think she is being watched, followed and monitored. She thinks possibly it is the guardians of clients, possibly DHS or the government. She hs been scared to be in her own home due to her paranoia. She has not been to work since the election. Her insight and judgement are impaired.  She denies suicidal or homicial thoughts, plans, actions or intentions.  She is using alcohol daily, 3-4 glasses of wine and marijuana.    ED/Hospital Course:  Maria Del Carmen Sheikh was medically cleared for admission to inpatient psychiatric unit. In the ED, she was not given any psychiatric PRNs and was cooperative with cares.     Patient interview:  Patient was seen in her room on admission.  She is pleasant and cooperative, however thought process is noted to be tangential  "at times. Initially, the patient reports that she has a history of severe anxiety and depression for several years and also references significant stressor of  father dying in November 2013 and her relationship with her boyfriend ending in 2014.  However, as the interview proceeded, the patient reveal more information about increased work stressors over the summer due to \"coverage\" as well as increased work stress/falling behind on her emails and meetings particularly in the last month.     She reports she has has had significant amount of increased work load that has made it very difficult for her to keep up with her work obligations.  She said she originally had 30 cases but since May it has increased to 80 cases and she found herself unable to keep with answering emails and phone calls. She felt like she was able to do a decent job of keeping up until 3 weeks ago where she was promoted to . She reports that she has only been able to get about 1 to 3 hours of sleep due to her anxiety as well as her staying up all night to try to catch up on her work duties.  She reports that she is usually on top of everything and had a full sense of confidence in her ability to handle all these additional work commitments; however, she  found herself overwhelmed and  decided to go no contact with her boss this past week.  This is unlike her and instead she felt like she needed to go in to take care of her mental health.  She reports over the last week she has been missing emails phone calls to the point where she ended up having guardians of her her social work cases show up in person leading to an escalation in her job.  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  She reports her boss had sent her an email saying that she needed to report to her boss's office promptly at Monday which stressed her significantly and upon the advice of her " "uncle, she agreed to come to the hospital instead.  She reports she has also increased anxiety because she is not sure if she still has her job given the way that she stopped contact with her boss abruptly.  She did report that she was able to at least contact HR before  being admitted.   The patient also reports being more stressed by the presidential elections last week, and smoking more cannabis during the last week than usual.     She also reports in May of this year she went to [facility] to get an evaluation to rule out ADHD but did not complete neuropsych testing.  She reports at that time she did get diagnosed with anxiety and depression.  She reports she was given referrals for therapy but also noted that her insurance did not cover the initial visit nor her medications.  She found it too overwhelming to try to coordinate care and did not follow up with taking the prescribed medication or any follow up recommendations. She tended to give additional details throughout the interview that at times were inappropriate to the question being asked and reported various things such as some difficulties with a close friend who made a comment that she was \"delusional\" about her relationship with her boyfriend of 10 years ago and found that to be very hurtful, having sexual tension with a friend who also happened to date her friend 10 years ago, relationship difficulties with her boyfriend of 10 years ago who she is still friends  with, getting into a argument with a friend over difference sin [employer] views, and how  in fall 2020 she had an increase desire to maintain cleanliness, repeatedly wash her hands, was hypervigilant about germs, was concerned about getting sick if she did not wash her hands but reports this is no longer an issue.     She endorses over the past couple of months decreased need for sleep, increased confidence, racing thoughts, increased energy, praying thoughts, feeling more connected to her " Faith, increased paranoia about losing her job, Has become more irritable, increased sexual desire of a friend but has not acted on it.   She denies SI, HI, SIB, AVH, panic attacks, eatnd disorder, PTSD sx and repeated episodes of depression throughout her life.   No collateral was obtained, no answer.      Psychiatric Review of Systems:     See HPI     Medical Review of Systems:     The Review of Systems is negative other than what is noted in the HPI.     Psychiatric History:     Prior diagnoses: Previous psychiatric diagnoses listed in the charts include MDD, BROOKLYN and ADHD.     Hospitalizations: None per chart review.    Court Commitments: None per Chart Review.     Suicide attempts: Patient denied    Self-injurious behavior: Patient denied    Violence towards others: None per Chart Review..     ECT/TMS: None per Chart Review..    Past medications:   Per Chart Review.: Escitalopram 10 mg - has not been adherent     Substance Use History:     Alcohol: She reports for the last year that she would drink a bottle of wine per day, prior to that it would be about 2-4 glasses.  She reports she first started to to drink 2 to 4 glasses each night in  during the pandemic.  She reports she briefly stopped drinking in October of last year when she was on a family vacation in [place] and since then she has been drinking every single day for the past year.     Cannabis:  Reports occasional use typically which increased todaily use during the last week.    Nicotine: Denies per charts.    Substances: Endorses  current daily use of Cannabis.    Chemical Dependency Treatment: Denies history of chemical dependency treatment      Social History:     Upbringing: Grew up in [facility] with her 2 siblings and mom.  Reports her mom got remarried when she was 6 and she was close with her stepfather who  in     Family/Relationships: Single, no children.she is close with her 2 brothers and her mom.  Her brother Scar  "lives in [place] as well as her mom, and her brother Cleve lives in [place]    Living Situation: Apartment, she lives alone    Education: Highest level of education obtained is: Bachelor's in sociology    Occupation: Works as a . Recently got a promotion to .    Legal: Denies history of legal issues.     Guns: no    Abuse/Trauma: Denies history of personal trauma but endorses that she has had to deal with traumatic social work cases     Service: None     Spirituality: Lutheran       Past Medical/Surgical History:     Past Medical History:   Diagnosis Date    Uncomplicated asthma      Reports history of asthma which she currently does not take medication for as well as atopic dermatitis   Past Medical History:   Diagnosis Date    Uncomplicated asthma        Past Surgical History:   Procedure Laterality Date    DENTAL SURGERY       Past Surgical History:   Procedure Laterality Date    DENTAL SURGERY          Family History:     Psychiatric Family Hx: Anxiety: mother and brother  Depression: brother  Chemical dependency: multiple family members  History reviewed. No pertinent family history.     Allergies:      Allergies   Allergen Reactions    Ibuprofen Hives    Cefpodoxime Nausea and Vomiting    Metronidazole Nausea and Vomiting     Other reaction(s): Dizziness        Medications:     Medications Prior to Admission   Medication Sig Dispense Refill Last Dose/Taking    escitalopram (LEXAPRO) 10 MG tablet Take 1 tablet (10 mg) by mouth daily. 60 tablet 0 11/8/2024 at  1:00 PM    hydrOXYzine HCl (ATARAX) 25 MG tablet Take 1 tablet (25 mg) by mouth 3 times daily as needed for itching. 30 tablet 1 11/8/2024 at  1:00 PM       See current inpatient medications below.     Vitals and Physical Exam:     /87 (BP Location: Left arm, Patient Position: Sitting, Cuff Size: Adult Regular)   Pulse 68   Temp 97.6  F (36.4  C) (Oral)   Resp 16   Ht 1.753 m (5' 9\")   Wt 83.4 kg (183 lb " 14.4 oz)   LMP 11/06/2024   SpO2 98%   BMI 27.16 kg/m      See ED assessment note by ED physician on 11/08.     Labs and Imaging:     Recent Results (from the past 72 hours)   Alcohol breath test POCT    Collection Time: 11/08/24  2:20 PM   Result Value Ref Range    Alcohol Breath Test 0.00 0.00 - 0.01   Comprehensive metabolic panel    Collection Time: 11/08/24  4:30 PM   Result Value Ref Range    Sodium 136 135 - 145 mmol/L    Potassium 3.7 3.4 - 5.3 mmol/L    Carbon Dioxide (CO2) 23 22 - 29 mmol/L    Anion Gap 14 7 - 15 mmol/L    Urea Nitrogen 8.6 6.0 - 20.0 mg/dL    Creatinine 0.53 0.51 - 0.95 mg/dL    GFR Estimate >90 >60 mL/min/1.73m2    Calcium 9.4 8.8 - 10.4 mg/dL    Chloride 99 98 - 107 mmol/L    Glucose 103 (H) 70 - 99 mg/dL    Alkaline Phosphatase 136 40 - 150 U/L    AST 66 (H) 0 - 45 U/L    ALT 70 (H) 0 - 50 U/L    Protein Total 7.6 6.4 - 8.3 g/dL    Albumin 4.7 3.5 - 5.2 g/dL    Bilirubin Total 1.0 <=1.2 mg/dL   HCG qualitative pregnancy (blood)    Collection Time: 11/08/24  4:30 PM   Result Value Ref Range    hCG Serum Qualitative Negative Negative   Alcohol level blood    Collection Time: 11/08/24  4:30 PM   Result Value Ref Range    Alcohol ethyl <0.01 <=0.01 g/dL   Magnesium    Collection Time: 11/08/24  4:30 PM   Result Value Ref Range    Magnesium 2.0 1.7 - 2.3 mg/dL   CBC with platelets and differential    Collection Time: 11/08/24  4:30 PM   Result Value Ref Range    WBC Count 6.1 4.0 - 11.0 10e3/uL    RBC Count 4.33 3.80 - 5.20 10e6/uL    Hemoglobin 14.6 11.7 - 15.7 g/dL    Hematocrit 40.8 35.0 - 47.0 %    MCV 94 78 - 100 fL    MCH 33.7 (H) 26.5 - 33.0 pg    MCHC 35.8 31.5 - 36.5 g/dL    RDW 11.7 10.0 - 15.0 %    Platelet Count 305 150 - 450 10e3/uL    % Neutrophils 67 %    % Lymphocytes 22 %    % Monocytes 9 %    % Eosinophils 1 %    % Basophils 1 %    % Immature Granulocytes 0 %    NRBCs per 100 WBC 0 <1 /100    Absolute Neutrophils 4.0 1.6 - 8.3 10e3/uL    Absolute Lymphocytes 1.4 0.8 -  5.3 10e3/uL    Absolute Monocytes 0.5 0.0 - 1.3 10e3/uL    Absolute Eosinophils 0.1 0.0 - 0.7 10e3/uL    Absolute Basophils 0.0 0.0 - 0.2 10e3/uL    Absolute Immature Granulocytes 0.0 <=0.4 10e3/uL    Absolute NRBCs 0.0 10e3/uL   Urine Drug Screen Panel    Collection Time: 11/08/24  4:31 PM   Result Value Ref Range    Amphetamines Urine Screen Negative Screen Negative    Barbituates Urine Screen Negative Screen Negative    Benzodiazepine Urine Screen Negative Screen Negative    Cannabinoids Urine Screen Negative Screen Negative    Cocaine Urine Screen Negative Screen Negative    Fentanyl Qual Urine Screen Negative Screen Negative    Opiates Urine Screen Negative Screen Negative    PCP Urine Screen Negative Screen Negative   TSH with free T4 reflex and/or T3 as indicated    Collection Time: 11/09/24  7:57 AM   Result Value Ref Range    TSH 2.71 0.30 - 4.20 uIU/mL   Lipid panel    Collection Time: 11/09/24  7:57 AM   Result Value Ref Range    Cholesterol 201 (H) <200 mg/dL    Triglycerides 74 <150 mg/dL    Direct Measure  >=50 mg/dL    LDL Cholesterol Calculated 58 <100 mg/dL    Non HDL Cholesterol 73 <130 mg/dL   Hemoglobin A1c    Collection Time: 11/09/24  7:57 AM   Result Value Ref Range    Estimated Average Glucose 105 <117 mg/dL    Hemoglobin A1C 5.3 <5.7 %   Vitamin B12    Collection Time: 11/09/24  7:57 AM   Result Value Ref Range    Vitamin B12 380 232 - 1,245 pg/mL   Folate    Collection Time: 11/09/24  7:57 AM   Result Value Ref Range    Folic Acid 14.9 4.6 - 34.8 ng/mL   Vitamin D Deficiency    Collection Time: 11/09/24  7:57 AM   Result Value Ref Range    Vitamin D, Total (25-Hydroxy) 17 (L) 20 - 50 ng/mL   GGT    Collection Time: 11/09/24  7:57 AM   Result Value Ref Range    GGT 70 (H) 5 - 36 U/L        Mental Status Examination:     Oriented to:  Grossly Oriented  General:  Alert  Appearance:  Grooming is adequate  Behavior/Attitude:  Cooperative, Engaged, and Open  Eye Contact:  "Appropriate  Psychomotor: Normal no catatonia present  Speech:  appropriate volume/tone and a bit pressured   Language: Fluent in English with appropriate syntax and vocabulary.  Mood:  \"anxious\"  Affect:  congruent with mood and animated, odd facial expressions at times   Thought Process:  rambling and tangential, hard to follow at times   Thought Content:   No SI/HI/AH/VH; No apparent delusions  Associations:  questionable  Insight:  partial   Judgment:  partial   Impulse control: partial  Attention Span:  adequate for conversation  Concentration:   not assessed  Recent and Remote Memory:  not formally assessed  Fund of Knowledge: average  Muscle Strength and Tone: normal  Gait and Station: Normal     Psychiatric Assessment:     Maria Del Carmen Sheikh is a 34 year old female with previous psychiatric diagnoses of AUD, Cannabis use disorder, BROOKLYN, MDD admitted from the ER on 11/09/2024 due to concern for psychosis and roselia in the context of psychosocial stressors including stress from the recent elections and increasing work stress over the past 6 months. On admission,  she presents with decreased need for sleep, racing thoughts, disorganization, delusions of paranoia thinking that she is being monitored by her work, poor insight and judgment.  Mental status is noted is notable for somewhat pressured speech with tangential and rambling thought process that was difficult to follow at times as well as odd facial expressions.  She described a pattern of decompensation precipitated by a doubling of her work load over the past 5 months in addition to a recent promotion  3 weeks ago where she was staying up all night to try to catch up on missed emails and phone calls.  This week she was facing a potential reprimand at work because she was unable to fulfill her duties and upon the advice of her uncle, agreeing to get evaluated.   Additionally her picture is further complicated by comorbid moderate alcohol use disorder. " Collateral reports noted that her behavior is not typical for the last month, and believe she has had increasing alcohol use especially in the last week as she was found to be intoxicated Wednesday morning by family. Increased cannabis use may also be contributing.  This is the patient's first psychiatry hospitalization.  Given her presentation there is concern for substance-induced mood changes and/or bipolar 2 disorder, with current episode hypomania the most likely differential at this time.  On evaluation there were no signs of overt psychosis, patient did not appear to be responding to internal stimuli, and patient discussed paranoia in the context of her job and not being able to fulfill her work duties.  It is unclear if collateral reports of disorganization and paranoia are secondary to a budding primary psychotic disorder or in the context of hypomania and further evaluation would be needed to discern.  At this time patient warrants inpatient psychiatric hospitalization to maintain her safety.      Psychiatric Plan by Diagnosis   Primary Psychiatric Diagnosis:    # Mood disorder, r/o Substance-Induced Mood Disorder versus R/o Bipolar II Disorder, hypomanic   - discontinued PTA Escitalopram 10 mg (Patient was non adherent)  - no scheduled medications started at time. Will continue to monitor patient over the weekend to obtain more information for diagnostic clarity    - primary team to call collateral for diagnostic clarity     #Alcohol Use Disorder, moderate   - Monitor for withdrawal symptoms  - continue MSSA  - continue B1 and vitamin supplementation  - added clonidine 0.1mg BID PRN for anxiety/alcohol withdrawal symptoms  - consider adding in gabapentin for cravings  - consider MICD consult     Secondary Psychiatric Diagnosis:   # BROOKLYN, by history  # MDD, by history  # ADHD, by history  #Cannabis Use       Pertinent Labs/Monitoring:   - EKG pending     Additional Plans:  - Patient will be treated in  therapeutic milieu with appropriate individual and group therapies as described     Psychiatric Hospital Course:      Maria Del Carmen Sheikh was admitted to [facility] as a voluntary patient. The patient was placed under status 15 (15 minute checks) to ensure patient safety. A CBC, BMP, HbA1c, lipids, TSH, Vitamin B12, Vitamin D, folate, GLORIA, utox were obtained and wnl. GGT was elevated at 70, AST at 66, ALT at 70. Hcg negative.  Given amount of alcohol consumption daily, patient was placed on MSSA.  Additionally, withdrawal and seizure precautions were placed. PTA Escitalopram was discontinued. New medications started at the time of admission include clonidine 0.1mg BID PRN for anxiety/alcohol withdrawal symptoms.      The risks, benefits, alternatives, and side effects were discussed and understood by the patient.      Medical Assessment and Plan     Medical diagnoses to be addressed this admission:    # Mildly elevated liver enzymes  - Suspect due to alcohol use  - Avoid Acetaminophen for 72 hours   - PCP follow up     # Mild abdominal pain  - attributed to menstrual cramps though bleeding has mostly resolved  - may be related to excessive alcohol use   - comfort measures (heat) for now but continue to monitor    Medical course: Patient was physically examined by the ED prior to being transferred to the unit and was found to be medically stable and appropriate for admission.     Consults:  none     Checklist     Legal Status: Voluntary Orders Placed This Encounter      Voluntary      Safety Assessment:   Behavioral Orders   Procedures    Cheeking Precautions (behavioral units)    Code 1 - Restrict to Unit    Elopement precautions    Routine Programming     As clinically indicated    Seizure precautions    Status 15     Every 15 minutes.    Withdrawal precautions       Risk Assessment:  Risk for harm is low.  Risk factors: substance use and family history  Protective factors: family and engaged in treatment     SIO:  No    Dispo: TBD. Disposition pending clinical stabilization, medication optimization and development of an appropriate discharge plan.     Attestations:     This patient was seen and discussed with my attending physician Dr. Estefany Blake who is in agreement with my assessment and plan.     Alma Rosa Barraza DO  Psychiatry Resident Physician    Attestation:  I, Estefany Blake MD, have personally performed an examination of this patient on November 09, 2024 and I have reviewed the resident's documentation.  I have edited the note to reflect all relevant changes. I agree with resident findings and plan in this resident H&P.  I have reviewed all vitals and laboratory findings.  Estefany Blake MD

## 2024-11-09 NOTE — PROGRESS NOTES
IP MH Referral Acuity Rating Score (RARS)    LMHP complete at referral to IP MH, with DEC; and, daily while awaiting IP MH placement. Call score to PPS.  CRITERIA SCORING   New 72 HH and Involuntary for IP MH (not adolescent) 0/1   Boarding over 24 hours 0/1   Vulnerable adult at least 55+ with multiple co morbidities; or, Patient age 11 or under 0/1   Suicide ideation without relief of precipitating factors 0/1   Current plan for suicide 0/1   Current plan for homicide 0/1   Imminent risk or actual attempt to seriously harm another without relief of factors precipitating the attempt 0/1   Severe dysfunction in daily living (ex: complete neglect for self care, extreme disruption in vegetative function, extreme deterioration in social interactions) 1/1   Recent (last 2 weeks) or current physical aggression in the ED 0/1   Restraints or seclusion episode in ED 0/1   Verbal aggression, agitation, yelling, etc., while in the ED 0/1   Active psychosis with psychomotor agitation or catatonia 1/1   Need for constant or near constant redirection (from leaving, from others, etc).  0/1   Intrusive or disruptive behaviors 0/1   TOTAL Acuity Total Score: 2        Mr. Calista Rinne is resting comfortably, and complains only of incisional pain on the left. He has a little if any pain in the back incision and has no pain, numbness or feeling of weakness in the legs.   He was up several times yesterday walking and felt no p

## 2024-11-09 NOTE — PLAN OF CARE
"        INITIAL PSYCHOSOCIAL ASSESSMENT AND NOTE    Information for assessment was obtained from:       []Patient     []Parent     []Community provider    [x]Hospital records   []Other     []Guardian       Presenting Problem:  Maria Del Carmen Chanel  is a 24 year old woman who uses she/her. She presented to [facility] ED on 11/08/24 with family for the following concerns:  decompensation and anxiety. She was admitted to [facility] voluntarily on 11/9/24.     Events leading to hospitalization and current stressors:     Per Dec assessment \" brought to the ED by uncle for mental health evaluation. She is calm and cooperative. Speech is pressured.  She talks at [facility] about work stress, promotion a month ago and excessive work and caseload. She has been struggling to manage it all. She is not sleeping more than 3-4 hours per night. She also talks about the stress of the recent presidential election and her dissatisfaction with the results.  Her thoughts appear to be disorganized and paranoid in content. She has begun to think she is being watched, followed and monitored. She thinks possibly it is the guardians of clients, possibly DHS or the government. She hs been scared to be in her own home due to her paranoia. She has not been to work since the election. Her insight and judgement are impaired.  She denies suicidal or homicial thoughts, plans, actions or intentions.  She is using alcohol daily, 3-4 glasses of wine and marijuana. \"     Current mental health symptoms as of today  Seee H&P      History of Mental Health and Chemical Dependency:  Mental Health History:  She has a has previous psychiatric diagnoses of AUD, Cannabis use disorder, [place], MDD and ADHD . no prior mental health history or hospitalizations .  She has engaged in individual therapy in the past. She has been seen in urgent care and the ED x2 in the past week     Substance Use History  Chemical Use History:  Marijuana and " "Alcohol: Daily  Last Use:: 11/07/24    Family Description (Constellation, significant information and events, Family Psychiatric History):    Per chart, Mom [place] [phone] Family collateral \" Her behavior is not typical for the last month.  She was promoted a month ago and this seems to be causing her to feel overwhelmed.  I think she has been drinking. Worries excessively about various things.  The political situation is distressing her, she fears for the future politically. Mom saw her Wednesday and Thursday and pt was drinking and intoxicated Wednesday morning. Mom suspects that pt has been using alcohol for some time but more so in the last week. \"       Significant Medical issues, Life events or Trauma history:   Denies trauma history     Living Situation:  Lives alone    Educational Background:    College     Occupational and Financial Status:   Occupational  Employed full time    Financial   Commercial insurance- [facility]    Legal (current or past history):     No history of concerns          Service History: No history     Ethnic/Cultural/Spiritual considerations:   The patient describes their cultural background as White/, heterosexual, female.  Contextual influences on patient's health include severity of symptoms.   Patient identified their preferred language to be English. Patient reported they do not need the assistance of an .      Social Functioning (organizations, interests, support system):   television/movies/videos, interaction with pets       Current Treatment Providers are:  Ghazal Sena PA-C as Assigned PCP  Jossy Adam PA-C as Physician Assistant (Family Medicine)  Anastacia Carrillo APRN CNP as Nurse Practitioner (OB/Gyn)  Lexii Mustafa CHW as Community Health Worker      Social Service Assessment/Plan:  Goals for hospitalization    Obtain outpatient mental health services and supports     Patient will have psychiatric assessment and medication " management by the psychiatrist. Medications will be reviewed and adjusted per DO/MD/APRN CNP as indicated. The treatment team will continue to assess and stabilize the patient's mental health symptoms with the use of medications and therapeutic programming. Hospital staff will provide a safe environment and a therapeutic milieu. Staff will continue to assess patient as needed. Patient will participate in unit groups and activities. Patient will receive individual and group support on the unit.      CTC will do individual inpatient treatment planning and after care planning. CTC will discuss options for increasing community supports with the patient. CTC will coordinate with outpatient providers and will place referrals to ensure appropriate follow up care is in place.

## 2024-11-09 NOTE — PLAN OF CARE
Goal Outcome Evaluation:      Plan of Care Reviewed With: patient    Patient presented with flat and blunted affect but pleasant upon approach. Her mood is calm. She is polite. She endorsed anxiety and depression 2/10. She denied SI/SIB/AH/VH and other MH psych symptoms and contracted for safety. Patient is medication compliant and cooperative with her cares. She was visible in the milieu but keeping to herself. She signed LUZ for her brother. LBM was on Wednesday. Patient accepted Miralax. Upon reassessment patient said it was working. Patient was visible in the milieu but interacting with peers. Patient spent most of the time in her room and only came out to make some requests from the writer. The patient was given all of her bathing items from her locker and said she will request for a shower when she is ready. There was no safety or behavioral concerns.

## 2024-11-09 NOTE — PLAN OF CARE
11/09/24 0700   Patient Belongings   Patient Belongings sent to security per site process;locker   Patient Belongings Put in Hospital Secure Location (Security or Locker, etc.) clothing;wallet;cell phone/electronics;keys   Belongings Search Yes   Clothing Search Yes   Second Staff Meti     Goal Outcome Evaluation:    Locker:  Ice bag, book, green flannel, green polka dot twist headband, Dude wipes, CeraVe hydrating makeup removing wipes, blue multi colored scarf, black tights, sunglasses, Old Spice deodorant, hair brush, large Hello toothpaste, 2 packs of sweet tarts (one opened) , white keds, white socks, brain, herbert shirt dress, period pads, unit socks and underwear, and multi colored pouch (ear buds, micellar water, panty liners, mini body wash, hand cream, roro pins, electric toothbrush, mini Sensodyne toothpaste, 3 packs of Uqoura, and Triamcinolone Acetonide Ointment USP, 0.1%    There was no cell phone, keys, or wallet found in items when searched.     Addendum 11/14/24: Shirt, slippers, prayer clayl    A               Admission:  I am responsible for any personal items that are not sent to the safe or pharmacy.  [place] is not responsible for loss, theft or damage of any property in my possession.    Signature:  _________________________________ Date: _______  Time: _____                                              Staff Signature:  ____________________________ Date: ________  Time: _____      2nd Staff person, if patient is unable/unwilling to sign:    Signature: ________________________________ Date: ________  Time: _____     Discharge:  [place] has returned all of my personal belongings:    Signature: _________________________________ Date: ________  Time: _____                                          Staff Signature:  ____________________________ Date: ________  Time: _____

## 2024-11-09 NOTE — PROGRESS NOTES
Maria Del Carmen Sheikh  November 9, 2024  Plan of Care Hand-off Note     Patient Care Path: inpatient mental health    Plan for Care:   Pt presents with paranoid, delusional thoughts. Her insight and judgement are impaired.  Her symptoms are affecting her functioning.  Admission is recommended for safety and stabilization.    Identified Goals and Safety Issues: reduce symptoms    Overview:  Joanie Saxena [phone]        Legal Status: Legal Status at Admission: Voluntary/Patient has signed consent for treatment    Psychiatry Consult: ordered        Updated  RN and MD  regarding plan of care.           Christina Ngo, SHAHIDSW

## 2024-11-09 NOTE — CONSULTS
Diagnostic Evaluation Consultation  Crisis Assessment    Patient Name: Maria Del Carmen Sheikh  Age:  34 year old  Legal Sex: female  Gender Identity: female  Pronouns:   Race: White  Ethnicity: Not  or   Language: English      Patient was assessed: In person   Crisis Assessment Start Date: 11/08/24  Crisis Assessment Start Time: 2115  Crisis Assessment Stop Time: 2015  Patient location: [facility] EMERGENCY DEPARTMENT                             [id number]    Referral Data and Chief Complaint  Maria Del Carmen Sheikh presents to the ED with family/friends. Patient is presenting to the ED for the following concerns: Anxiety.   Factors that make the mental health crisis life threatening or complex are:  Pt brought to the ED by uncle for mental health evaluation. She is calm and cooperative. Speech is pressured.  She talks at lenght about work stress, promotion a month ago and excessive work and caseload. She has been struggling to manage it all. She is not sleeping more than 3-4 hours per night. She also talks about the stress of the recent presidential election and her dissatisfaction with the results.  Her thoughts appear to be disorganized and paranoid in content. She has begun to think she is being watched, followed and monitored. She thinks possibly it is the guardians of clients, possibly DHS or the government. She hs been scared to be in her own home due to her paranoia. She has not been to work since the election. Her insight and judgement are impaired.  She denies suicidal or homicial thoughts, plans, actions or intentions.  She is using alcohol daily, 3-4 glasses of wine and marijuana.      Informed Consent and Assessment Methods  Explained the crisis assessment process, including applicable information disclosures and limits to confidentiality, assessed understanding of the process, and obtained consent to proceed with the assessment.  Assessment methods included conducting a formal interview with  patient, review of medical records, collaboration with medical staff, and obtaining relevant collateral information from family and community providers when available.  :       Patient response to interventions: acceptance expressed  Coping skills were attempted to reduce the crisis:  talked to family and friends.     History of the Crisis   Pt has no prior mental health history.    Brief Psychosocial History  Family:  Single, Children no  Support System:  Parent(s), Friend  Employment Status:  employed full-time  Source of Income:  salary/wages  Financial Environmental Concerns:  none  Current Hobbies:  television/movies/videos, interaction with pets  Barriers in Personal Life:       Significant Clinical History  Current Anxiety Symptoms:  anxious  Current Depression/Trauma:  impaired decision making, difficulty concentrating, helplessness  Current Somatic Symptoms:  anxious, excessive worry, racing thoughts  Current Psychosis/Thought Disturbance:  elated mood  Current Eating Symptoms:     Chemical Use History:  Alcohol: Daily  Last Use:: 11/07/24  Benzodiazepines: None  Opiates: None  Cocaine: None  Marijuana: Daily  Other Use: None   Past diagnosis:  No known past diagnosis  Family history:  No known history of mental health or chemical health concerns  Past treatment:  Individual therapy  Details of most recent treatment:  see has been seen in urgent care and ED x2 in recent days, seeking support. She was started on Lexapro and Hydroxyzine 2 days ago and started the medication today.  Other relevant history:          Collateral Information  Is there collateral information: Yes     Collateral information name, relationship, phone number:  Joanie Saxena [phone]    What happened today: She was seen by Urgent Care today. Once home she spoke to her uncle over the phone and she sounded dysregulated and emotional. He though she should be seen in the ED.     What is different about patient's functioning: Her  behavior is not typical for the last month.  She was promoted a month ago and this seems to be causing her to feel overwhelmed.  I think she has been drinking. Worries excessively about various things.  The political situation is distressing her, she fears for the future politically. Mom saw her Wednesday and Thursday and pt was drinking and intoxicated Wednesday morning. Mom suspects that pt has been using alcohol for some time but more so in the last week.     Concern about alcohol/drug use:  Yes. She is drinking daily      Has patient made comments about wanting to kill themselves/others: no    If d/c is recommended, can they take part in safety/aftercare planning:  yes    Additional collateral information:  Friend Kristie present. She states that pt has been increasingly paranoid and delusional the last several months. Her thoughts are disorganized.     Risk Assessment  [place] Suicide Severity Rating Scale Full Clinical Version:  Suicidal Ideation  Q1 Wish to be Dead (Lifetime): No  Q2 Non-Specific Active Suicidal Thoughts (Lifetime): No  Q6 Suicide Behavior (Lifetime): no     Suicidal Behavior (Lifetime)  Actual Attempt (Lifetime): No  Has subject engaged in non-suicidal self-injurious behavior? (Lifetime): No  Interrupted Attempts (Lifetime): No  Aborted or Self-Interrupted Attempt (Lifetime): No  Preparatory Acts or Behavior (Lifetime): No    [place] Suicide Severity Rating Scale Recent:   Suicidal Ideation (Recent)  Q1 Wished to be Dead (Past Month): no  Q2 Suicidal Thoughts (Past Month): no  Level of Risk per Screen: no risks indicated          Environmental or Psychosocial Events: helplessness/hopelessness  Protective Factors: Protective Factors: strong bond to family unit, community support, or employment, help seeking, responsibilities and duties to others, including pets and children    Does the patient have thoughts of harming others? Feels Like Hurting Others: no  Previous Attempt to Hurt Others:  no  Current presentation:  (cooperative. Anxious)  Is the patient engaging in sexually inappropriate behavior?: no    Is the patient engaging in sexually inappropriate behavior?  no        Mental Status Exam   Affect: Dramatic  Appearance: Appropriate  Attention Span/Concentration: Attentive  Eye Contact: Engaged    Fund of Knowledge: Appropriate   Language /Speech Content: Expressive Speech  Language /Speech Volume: Normal  Language /Speech Rate/Productions: Pressured  Recent Memory: Intact  Remote Memory: Intact  Mood: Anxious  Orientation to Person: Yes   Orientation to Place: Yes  Orientation to Time of Day: Yes  Orientation to Date: Yes     Situation (Do they understand why they are here?): Yes  Psychomotor Behavior: Normal  Thought Content:    Thought Form:         Medication  Psychotropic medications:   Medication Orders - Psychiatric (From admission, onward)      Start     Dose/Rate Route Frequency Ordered Stop    11/09/24 0800  escitalopram (LEXAPRO) tablet 10 mg         10 mg Oral DAILY 11/08/24 1833 11/08/24 2052  diazepam (VALIUM) tablet 5-20 mg         5-20 mg Oral EVERY 30 MIN PRN 11/08/24 2052 11/08/24 1833  OLANZapine zydis (zyPREXA) ODT tab 10 mg         10 mg Oral 2 TIMES DAILY PRN 11/08/24 1833      11/08/24 1832  hydrOXYzine HCl (ATARAX) tablet 25 mg         25 mg Oral EVERY 6 HOURS PRN 11/08/24 1833               Current Care Team  Patient Care Team:  No Ref-Primary, Physician as PCP - General  Ghazal Sena PA-C as Assigned PCP  Jossy Adam PA-C as Physician Assistant (Family Medicine)  Anastacia Carrillo APRN CNP as Nurse Practitioner (OB/Gyn)  Lexii Mustafa CHW as Community Health Worker    Diagnosis  Patient Active Problem List   Diagnosis Code    Exposure to bat without known bite Z20.9    Eczema L30.9    Hand dermatitis L30.9    Abnormal cervical Papanicolaou smear, unspecified abnormal pap finding R87.619    Papanicolaou smear of cervix with low grade squamous  intraepithelial lesion (LGSIL) R87.612    Psychosis, unspecified psychosis type (H) F29       Primary Problem This Admission  Active Hospital Problems    Psychosis, unspecified psychosis type (H)        Clinical Summary and Substantiation of Recommendations   Pt presents with paranoid, delusional thoughts. Her insight and judgement are impaired.  Her symptoms are affecting her functioning.  Admission is recommended for safety and stabilization.          Severe psychiatric, behavioral or other comorbid conditions are appropriate for management at inpatient mental health as indicated by at least one of the following: Symptoms of impact to function, Impaired impulse control, judgement, or insight  Severe dysfunction in daily living is present as indicated by at least one of the following: Other evidence of severe dysfunction, Extreme deterioration in social interactions, Complete inability to maintain any appropriate aspect of personal responsibility in any adult roles  Situation and expectations are appropriate for inpatient care: Voluntary treatment at lower level of care is not feasible  Inpatient mental health services are necessary to meet patient needs and at least one of the following: Specific condition related to admission diagnosis is present and judged likely to further improve at proposed level of care      Patient coping skills attempted to reduce the crisis:  talked to family and friends.    Disposition  Recommended disposition: Inpatient Mental Health        Reviewed case and recommendations with attending provider. Attending Name: Dr. Bennett       Attending concurs with disposition: yes       Patient and/or validated legal guardian concurs with disposition:   yes       Final disposition:  inpatient mental health  Clinical information given to Gilberto at Intake 11/8@[facility] for placement     Legal status on admission: Voluntary/Patient has signed consent for treatment    Assessment Details   Total  duration spent with the patient: 60 min     CPT code(s) utilized: 83263 - Psychotherapy for Crisis - 60 (30-74*) min    CRISTIAN Bustamante, Psychotherapist  [facility] - Triage & Transition Services

## 2024-11-09 NOTE — TELEPHONE ENCOUNTER
S: [facility] , DEC  Renetta  calling at 9:07 PM about a 34 year old/Female presenting with paranoia and delusional thoughts, hasn't been sleeping or working most of week d/t delusional thoughts    B: Pt arrived via Family. Presenting problem, stressors: recent election, worried she will get fired from her alia worried DHS is coming after her    Pt affect in ED: Dramatic  Pt Dx:  Delusional Disorder  Previous IPMH hx? No  Pt denies SI   Hx of suicide attempt? No  Pt denies SIB  Pt denies HI   Pt denies hallucinations .   Pt RARS Score: 2    Hx of aggression/violence, sexual offenses, legal concerns, Epic care plan? describe: No  Current concerns for aggression this visit? No  Does pt have a history of Civil Commitment? No  Is Pt their own guardian? Yes    Pt is prescribed medication. Is patient medication compliant? N/A  Pt denies OP services   CD concerns: Actively using/consuming etoh daily 3-4 glasses of wine daily, and daily thc use in last week  Acute or chronic medical concerns: No  Does Pt present with specific needs, assistive devices, or exclusionary criteria? None      Pt is ambulatory  Pt is able to perform ADLs independently      A: Pt to be reviewed for [facility] admission. Pt is Voluntary  Preferred placement: [facility]    COVID Symptoms: No  If yes, COVID test required   Utox: Negative   CMP: Abnormalities: Glucose 103, AST 66, ALT 70  CBC: Abnormalities: MCH 33.7  HCG: Negative    R: Patient cleared and ready for behavioral bed placement: Yes  Pt placed on IP worklist? Yes    Does Patient need a Transfer Center request created? No, Pt is located within [facility] ED, [facility] ED, or [facility]     9:53 PM PPS gave MRN to resident on call to review for st [facility]/ [place], pending cb.

## 2024-11-09 NOTE — PLAN OF CARE
Problem: Sleep Disturbance  Goal: Adequate Sleep/Rest  Outcome: Progressing   Goal Outcome Evaluation:  Patient slept off and on after the admission. Endorsed abdominal period cramps and requested Tylenol, on-call provider notified as patient does not have it ordered. Provider called back and indicated he will hold off on ordering Tylenol given patient elevated liver labs. Patient is also allergic to Ibuprofen. Warm  packs offered with noted relief. Patient appears to have slept for 1 hour.

## 2024-11-09 NOTE — PROGRESS NOTES
Patient is a new admit to station 20 from the [facility] at 0215 AM. Patient appeared to to in good spirits during the initial encounter, appeared to be in no distress. Per documentation, patient was brought in to the ED due to increased paranoia concerns about people watching her. During the admission process patient also noted that she had been overwhelmed at work and was drinking more than usual. Patient is alert and oriented, pleasant and compliant with the search and admission process. Denied any pain. Denied any SI, depression, anxiety and hallucinations. Patient contracted for safety. Patient is voluntary. Brief orientation of the unit performed. Patient was briefly awake after the admission then went to sleep. No requests for prn's. MSSA score was 1.

## 2024-11-10 PROCEDURE — 250N000013 HC RX MED GY IP 250 OP 250 PS 637

## 2024-11-10 PROCEDURE — 124N000002 HC R&B MH UMMC

## 2024-11-10 RX ADMIN — THIAMINE HCL TAB 100 MG 100 MG: 100 TAB at 08:17

## 2024-11-10 RX ADMIN — HYDROXYZINE HYDROCHLORIDE 25 MG: 25 TABLET, FILM COATED ORAL at 16:15

## 2024-11-10 RX ADMIN — FOLIC ACID 1 MG: 1 TABLET ORAL at 08:16

## 2024-11-10 RX ADMIN — Medication 1 TABLET: at 08:16

## 2024-11-10 NOTE — PLAN OF CARE
Problem: Sleep Disturbance  Goal: Adequate Sleep/Rest  Outcome: Progressing   Goal Outcome Evaluation:    Patient appears to have slept a total of 6.75 hours. Reports that she feels well rested.     MSSA: 4    Denied withdrawal symptoms and no withdrawal signs observed other than very mild sweating. Safety/environment checks conducted every 15 minutes with no further concerns noted.

## 2024-11-10 NOTE — PLAN OF CARE
The pt was visible in the milieu for most of the shift, watching TV and interacting with peers. The pt presented with a calm mood, with an affect ranging from flat to bright. The pt endorsed moderate anxiety and depression, attributing trigger to concerns about workplace absence and being in the hospital. The pt's concerns was acknowledged and receptive. The pt denied AH/VH, SI/HI and contracted for safety. The pt denied experiencing alcohol withdrawal symptoms, with an MSSA score of 2. The pt was able to utilize positive coping mechanisms for anxiety by being social with peers and watching TV. No behavioral escalations or safety concerns were observed or reported. The pt complained abdominal cramp pain at 3/10, attributed to the menstrual period, which relieved with a hot pack. Appetite was good and adequate fluid intake. The pt's brother and wife visited and the visit went well. Appeared attending OT group. During HS, the pt reported experiencing anxiety at 6/10, PRN Hydroxyzine was given and reported as helpful. As requested, PRN Melatonin was given for sleep aid.    Problem: Adult Behavioral Health Plan of Care  Goal: Plan of Care Review  Outcome: Progressing  Flowsheets (Taken 11/9/2024 1630)  Plan of Care Reviewed With: patient  Patient Agreement with Plan of Care: agrees     Problem: Anxiety  Goal: Anxiety Reduction or Resolution  Outcome: Progressing  Intervention: Promote Anxiety Reduction  Recent Flowsheet Documentation  Taken 11/9/2024 1630 by Raman Mcbride, RN  Family/Support System Care:   presence promoted                     self-care encouraged   support provided     Problem: Psychotic Signs/Symptoms  Goal: Improved Mood Symptoms (Psychotic Signs/Symptoms)  Outcome: Progressing  Intervention: Optimize Emotion and Mood  Recent Flowsheet Documentation  Taken 11/9/2024 1630 by Raman Mcbride RN  Diversional Activity: television   Goal Outcome Evaluation:    Plan of Care Reviewed With: patient Plan  of Care Reviewed With: patient

## 2024-11-10 NOTE — PLAN OF CARE
Goal Outcome Evaluation:      Plan of Care Reviewed With: patient    Overall Patient Progress: improving    Patient presented with a flat and blunted affect but brightened up upon approach. Patient denied pain or any form of discomfort. She mentioned that her menstral cramps from yesterday was resolved. She denied all MH psych symptoms and contracted for safety. Patient's hygiene is appropriate. She took a shower today. She is eating and drinking adequately. There were no symptoms of alcohol withdrawal noted. She was visible in the milieu but not interacting with peers. Patient spent a lot of time in her room reading books and keeping to herself. There was no safety or behavioral concerns. Will continue the current plan of care and support.

## 2024-11-10 NOTE — CARE PLAN
Rehab Group    Start time: 1620  End time: 1705  Patient time total: 38 minutes (billed only one unit as she was alone in group for part of the time)    attended full group    #2 attended   Group Type: occupational therapy   Group Topic Covered: cognitive activities, coping skills, educational support, emotional regulation, substance use/recovery , self-care, self-esteem, and social skills       Group Session Detail:   Education and group discussion provided on the benefits of positive thinking with hands on choice of Affirmation or Gratitude Calendar for concentration, follow through, attention to detail, organization/planning, coping, mood stabilization, reality-based activity, building self awareness and self esteem, recovery/future focused, and expanding social skills.        Patient Response/Contribution:  attentive and actively engaged       Patient Detail:  Pt was pleasant and talkative about her experiences while in the ED as well as prior to admission.  Pt completed a gratitude calendar while she shared.  Pt was pleasant and organized in her thought process within the context of this group.  Her level of education was evident in her vocabulary and ability to work independently with little guidance needed.  Pt verbalized awareness of coping skills and acknowledged that she just got too overwhelmed for them to be effective.          [id number] OT Group (2 or more in attendance)      Patient Active Problem List   Diagnosis    Exposure to bat without known bite    Eczema    Hand dermatitis    Abnormal cervical Papanicolaou smear, unspecified abnormal pap finding    Papanicolaou smear of cervix with low grade squamous intraepithelial lesion (LGSIL)    Psychosis, unspecified psychosis type (H)    Paranoia (H)    Delusional ideas (H)    Acute psychosis (H)

## 2024-11-11 LAB
ALBUMIN UR-MCNC: NEGATIVE MG/DL
APPEARANCE UR: CLEAR
BACTERIA #/AREA URNS HPF: ABNORMAL /HPF
BILIRUB UR QL STRIP: NEGATIVE
COLOR UR AUTO: ABNORMAL
ERYTHROCYTE [SEDIMENTATION RATE] IN BLOOD BY WESTERGREN METHOD: 6 MM/HR (ref 0–20)
GLUCOSE UR STRIP-MCNC: NEGATIVE MG/DL
HGB UR QL STRIP: ABNORMAL
HIV 1+2 AB+HIV1 P24 AG SERPL QL IA: NONREACTIVE
KETONES UR STRIP-MCNC: NEGATIVE MG/DL
LEUKOCYTE ESTERASE UR QL STRIP: ABNORMAL
NITRATE UR QL: NEGATIVE
PH UR STRIP: 7 [PH] (ref 5–7)
RBC URINE: 1 /HPF
SP GR UR STRIP: 1 (ref 1–1.03)
SQUAMOUS EPITHELIAL: 5 /HPF
T PALLIDUM AB SER QL: NONREACTIVE
UROBILINOGEN UR STRIP-MCNC: NORMAL MG/DL
WBC URINE: 3 /HPF

## 2024-11-11 PROCEDURE — 124N000002 HC R&B MH UMMC

## 2024-11-11 PROCEDURE — 90834 PSYTX W PT 45 MINUTES: CPT

## 2024-11-11 PROCEDURE — 85652 RBC SED RATE AUTOMATED: CPT

## 2024-11-11 PROCEDURE — 250N000013 HC RX MED GY IP 250 OP 250 PS 637

## 2024-11-11 PROCEDURE — 99232 SBSQ HOSP IP/OBS MODERATE 35: CPT | Mod: GC | Performed by: STUDENT IN AN ORGANIZED HEALTH CARE EDUCATION/TRAINING PROGRAM

## 2024-11-11 PROCEDURE — 81001 URINALYSIS AUTO W/SCOPE: CPT

## 2024-11-11 PROCEDURE — 86780 TREPONEMA PALLIDUM: CPT

## 2024-11-11 PROCEDURE — 86255 FLUORESCENT ANTIBODY SCREEN: CPT

## 2024-11-11 PROCEDURE — 87389 HIV-1 AG W/HIV-1&-2 AB AG IA: CPT

## 2024-11-11 PROCEDURE — 93005 ELECTROCARDIOGRAM TRACING: CPT

## 2024-11-11 PROCEDURE — 36415 COLL VENOUS BLD VENIPUNCTURE: CPT

## 2024-11-11 RX ORDER — LITHIUM CARBONATE 300 MG/1
300 TABLET, FILM COATED, EXTENDED RELEASE ORAL DAILY
Status: DISCONTINUED | OUTPATIENT
Start: 2024-11-11 | End: 2024-11-12

## 2024-11-11 RX ORDER — RISPERIDONE 0.5 MG/1
0.5 TABLET ORAL AT BEDTIME
Status: DISCONTINUED | OUTPATIENT
Start: 2024-11-11 | End: 2024-11-14

## 2024-11-11 RX ADMIN — FOLIC ACID 1 MG: 1 TABLET ORAL at 07:56

## 2024-11-11 RX ADMIN — RISPERIDONE 0.5 MG: 0.5 TABLET, FILM COATED ORAL at 21:54

## 2024-11-11 RX ADMIN — Medication 125 MCG: at 13:21

## 2024-11-11 RX ADMIN — Medication 1 TABLET: at 07:56

## 2024-11-11 RX ADMIN — LITHIUM CARBONATE 300 MG: 300 TABLET, EXTENDED RELEASE ORAL at 10:56

## 2024-11-11 RX ADMIN — THIAMINE HCL TAB 100 MG 100 MG: 100 TAB at 07:56

## 2024-11-11 ASSESSMENT — ACTIVITIES OF DAILY LIVING (ADL)
ADLS_ACUITY_SCORE: 0
HYGIENE/GROOMING: INDEPENDENT
ADLS_ACUITY_SCORE: 0
DRESS: INDEPENDENT
ORAL_HYGIENE: INDEPENDENT
ADLS_ACUITY_SCORE: 0

## 2024-11-11 NOTE — CARE PLAN
Rehab Group    Start time: 1100  End time: 1145  Patient time total: 45 minutes    attended full group    #5 attended   Group Type: OT Clinic   Group Topic Covered: activity therapy and coping skills     Group Session Detail:  Coping skill exploration, creative expression within personally meaningful activities, and observation of social, cognitive, and kinesthetic performance skills     Patient Response/Contribution:  positive affect, cooperative with task, and actively engaged     Patient Detail:  Bright affect. Expressed interest to initiate a self-directed project. IND to gather materials, organize work space, and sequence a painted ceramic task to completion. Demonstrated fair organization. Engaged in appropriate conversation with peers upon approach.         [id number] OT Group (2 or more in attendance)      Patient Active Problem List   Diagnosis    Exposure to bat without known bite    Eczema    Hand dermatitis    Abnormal cervical Papanicolaou smear, unspecified abnormal pap finding    Papanicolaou smear of cervix with low grade squamous intraepithelial lesion (LGSIL)    Psychosis, unspecified psychosis type (H)    Paranoia (H)    Delusional ideas (H)    Acute psychosis (H)

## 2024-11-11 NOTE — PLAN OF CARE
Problem: Adult Behavioral Health Plan of Care  Goal: Adheres to Safety Considerations for Self and Others  Outcome: Progressing  Intervention: Develop and Maintain Individualized Safety Plan  Recent Flowsheet Documentation  Taken 11/10/2024 1810 by Mehreen Gandhi RN  Safety Measures: safety rounds completed     Problem: Psychotic Signs/Symptoms  Goal: Increased Participation and Engagement (Psychotic Signs/Symptoms)  Intervention: Facilitate Participation and Engagement  Recent Flowsheet Documentation  Taken 11/10/2024 1810 by Mehreen Gandhi RN  Diversional Activity: television     Problem: Psychotic Signs/Symptoms  Goal: Improved Mood Symptoms (Psychotic Signs/Symptoms)  Intervention: Optimize Emotion and Mood  Recent Flowsheet Documentation  Taken 11/10/2024 1810 by Mehreen Gandhi RN  Diversional Activity: television   Goal Outcome Evaluation:    Pt was visible in the lounge watching isolative  and withdrawn to self. Pt did not engage or socialize with peers.  Mood is anxious and depressed. Pt requested and received Hydroxyzine for anxiety.  She reported that the intervention was effective.She endorsed anxiety 8 and depression 7. She reported  that the intervention was effective. Pt denied all mental health and psych symptoms.  Po intake adequate.No behavior outburst noted. Pt contracted for safety. MASSA score at 1700 pm was 1 and 1 at 2100 pm.

## 2024-11-11 NOTE — PROGRESS NOTES
----------------------------------------------------------------------------------------------------------  [facility]  Psychiatry Progress Note  Hospital Day #2     Interim History:     The patient's care was discussed with the treatment team and chart notes were reviewed.    Vitals: VSS  Sleep: 6.75 hours (11/11/24 0600)  Scheduled medications: Took all scheduled medications as prescribed  Psychiatric PRN medications: No psychiatric prns given    Staff Report:   No acute events or safety concerns overnight. Per staff, The pt presented with a calm mood, with an affect ranging from flat to bright. The pt endorsed moderate anxiety and depression, attributing trigger to concerns about workplace absence and being in the hospital. No behavioral escalations or safety concerns were observed or reported.      Subjective:     Patient Interview:  Maria Del Carmen Sheikh was interviewed in her room. She says her morning is going well, she's been feeling better since getting in the unit.    Since being in the unit, she says she has been sleeping better, getting up to 10 hours a night. She notes of increased stress at work due to recent promotion and increased workload. Notes that the reason why she came to the ED was due to recent election results and how it's taken a toll on her mental health. Endorses anxiety due to workload at work and depression at a 6/10. Feels hopelessness and overwhelmed about the job, election results, interpersonal romantic relationship.     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 Patient says that she's been feeling like the guardians have been bothering her regarding how cases are handled and hence has been feeling overwhelmed. Patient denies any SI/SIB/HI. Denies AVH. Patient denies receiving any special messages or feeling like her thoughts are not her  "own.     When symptoms of roselia as described by collaterals, she agrees that she has been having decreased need for sleep, increased energy, goal-directed behavior, along with increased paranoia, which has been hindering her from working. Informed patient that she most likely experienced a manic episode, with mixed mood symptoms. Agreeable to starting Lithium and a low-dose Risperidone.     Collateral obtained from mother [place] ([phone]):    - Has been with increased anxiety and paranoia for the past two months. Anxiety worsened since election results past week and since her promotion at work last month.   - Thinks she's had a manic episode starting a few days before election - was staying up all night, with decreased need for sleep, increased energy in the morning. Thursday morning she had not slept the day prior but was cooking a lot at 5 am in the morning.   - With pressured speech, would jump from one topic to another. Was difficult to follow her train of thoughts.   - This manic episode affected her day-to-day functionality - has not been able to work.   - Regarding substance use, mother found out this past week that she's been drinking a lot more than she typically does. Has been using drinking as a coping mechanism. She talked to patient prior to hospitalization - patient interested in residential CD tx.     ROS:  Patient has no bothersome physical symptoms  Patient denies acute concerns     Objective:     Vitals:  /87 (BP Location: Right arm)   Pulse 62   Temp 97.3  F (36.3  C) (Temporal)   Resp 18   Ht 1.753 m (5' 9\")   Wt 83.4 kg (183 lb 14.4 oz)   LMP 11/06/2024   SpO2 99%   BMI 27.16 kg/m      Allergies:  Allergies   Allergen Reactions    Ibuprofen Hives    Cefpodoxime Nausea and Vomiting    Metronidazole Nausea and Vomiting     Other reaction(s): Dizziness       Current Medications:  Scheduled:  Current Facility-Administered Medications   Medication Dose Route Frequency Provider " Last Rate Last Admin    alum & mag hydroxide-simethicone (MAALOX) suspension 30 mL  30 mL Oral Q4H PRN Dagher, Ramez, MD        cloNIDine (CATAPRES) tablet 0.1 mg  0.1 mg Oral BID PRN Vilceus, Nissah, DO        diazepam (VALIUM) tablet 5-20 mg  5-20 mg Oral Q30 Min PRN Dagher, Ramez, MD        folic acid (FOLVITE) tablet 1 mg  1 mg Oral Daily Dagher, Ramez, MD   1 mg at 11/11/24 0756    hydrOXYzine HCl (ATARAX) tablet 25 mg  25 mg Oral Q4H PRN Dagher, Ramez, MD   25 mg at 11/10/24 1615    melatonin tablet 3 mg  3 mg Oral At Bedtime PRN Dagher, Ramez, MD   3 mg at 11/09/24 2057    multivitamin w/minerals (THERA-VIT-M) tablet 1 tablet  1 tablet Oral Daily Dagher, Ramez, MD   1 tablet at 11/11/24 0756    OLANZapine (zyPREXA) tablet 10 mg  10 mg Oral TID PRN Dagher, Ramez, MD        Or    OLANZapine (zyPREXA) injection 10 mg  10 mg Intramuscular TID PRN Dagher, Ramez, MD        polyethylene glycol (MIRALAX) Packet 17 g  17 g Oral Daily PRN Dagher, Ramez, MD   17 g at 11/09/24 0839    thiamine (B-1) tablet 100 mg  100 mg Oral Daily Dagher, Ramez, MD   100 mg at 11/11/24 0756       PRN:  Current Facility-Administered Medications   Medication Dose Route Frequency Provider Last Rate Last Admin    alum & mag hydroxide-simethicone (MAALOX) suspension 30 mL  30 mL Oral Q4H PRN Dagher, Ramez, MD        cloNIDine (CATAPRES) tablet 0.1 mg  0.1 mg Oral BID PRN Vilceus, Nissah, DO        diazepam (VALIUM) tablet 5-20 mg  5-20 mg Oral Q30 Min PRN Dagher, Ramez, MD        folic acid (FOLVITE) tablet 1 mg  1 mg Oral Daily Dagher, Ramez, MD   1 mg at 11/11/24 0756    hydrOXYzine HCl (ATARAX) tablet 25 mg  25 mg Oral Q4H PRN Dagher, Ramez, MD   25 mg at 11/10/24 1615    melatonin tablet 3 mg  3 mg Oral At Bedtime PRN Dagher, Ramez, MD   3 mg at 11/09/24 2057    multivitamin w/minerals (THERA-VIT-M) tablet 1 tablet  1 tablet Oral Daily Dagher, Ramez, MD   1 tablet at 11/11/24 0668    OLANZapine (zyPREXA) tablet 10 mg  10 mg Oral TID PRN  "Dagher, Ramez, MD        Or    OLANZapine (zyPREXA) injection 10 mg  10 mg Intramuscular TID PRN Dagher, Ramez, MD        polyethylene glycol (MIRALAX) Packet 17 g  17 g Oral Daily PRN Dagher, Ramez, MD   17 g at 11/09/24 0839    thiamine (B-1) tablet 100 mg  100 mg Oral Daily Dagher, Ramez, MD   100 mg at 11/11/24 0756       Labs and Imaging:  New results:   No results found for this or any previous visit (from the past 24 hours).    Data this admission:  - CBC unremarkable  - CMP unremarkable  - TSH normal  - UDS negative  - Vit D level was low, 17  - Hgb A1c normal  - Lipids unremarkable  - Vit B12 normal  - Folate normal     Mental Status Exam:     Oriented to:  Grossly Oriented  General:  Awake and Alert  Appearance:  appears stated age  Behavior/Attitude:  Calm and Cooperative  Eye Contact: Appropriate  Psychomotor: Tics no catatonia present  Speech:  appropriate volume/tone  Language: Fluent in English with appropriate syntax and vocabulary.  Mood:  \"anxious\"  Affect:  appropriate and congruent with mood  Thought Process:  linear and coherent  Thought Content:   No SI/HI/AH/VH; No apparent delusions  Associations:  intact  Insight:  partial due to mood episode  Judgment:  fair due to recognition of mental illness  Impulse control: good  Attention Span:  grossly intact  Concentration:  grossly intact  Recent and Remote Memory:  not formally assessed  Fund of Knowledge: average  Muscle Strength and Tone: normal  Gait and Station: Normal     Psychiatric Assessment     Maria Del Carmen Sheikh is a 34 year old female with previous psychiatric diagnoses of AUD, Cannabis use disorder, [place], MDD admitted from the ER on 11/09/2024 due to concern for psychosis and roselia in the context of psychosocial stressors including stress from the recent elections and increasing work stress over the past 6 months. On admission, she presents with decreased need for sleep, racing thoughts, disorganization, delusions of paranoia thinking that " she is being monitored by her work, poor insight and judgment.  Mental status is noted is notable for somewhat pressured speech with tangential and rambling thought process that was difficult to follow at times as well as odd facial expressions.  She described a pattern of decompensation precipitated by a doubling of her work load over the past 5 months in addition to a recent promotion  3 weeks ago where she was staying up all night to try to catch up on missed emails and phone calls.  This week she was facing a potential reprimand at work because she was unable to fulfill her duties and upon the advice of her uncle, agreeing to get evaluated.   Additionally her picture is further complicated by comorbid moderate alcohol use disorder. Collateral reports noted that her behavior is not typical for the last month, and believe she has had increasing alcohol use especially in the last week as she was found to be intoxicated Wednesday morning by family. Increased cannabis use may also be contributing.  This is the patient's first psychiatric hospitalization.      Given her presentation there is concern for substance-induced mood changes and/or bipolar disorder, with current episode hypomania/roselia the most likely diagnosis at this time.  On evaluation there were no signs of overt psychosis, patient did not appear to be responding to internal stimuli, and patient discussed paranoia in the context of her job and not being able to fulfill her work duties as well as stress due to recent election results.  It is unclear if collateral reports of disorganization and paranoia are secondary to a budding primary psychotic disorder or in the context of hypo/roselia and further evaluation would be needed to discern.  At this time patient warrants inpatient psychiatric hospitalization to maintain her safety.      Psychiatric Plan by Diagnosis      Today's changes:  - Started lithium 300 mg daily  - Started risperidone 0.5 mg at bed  time.   - Labs for first episode psychosis workup ordered including: Treponema Ab, Lyme, ESR, JAY, HIV, NMDA receptor Ab     # Bipolar I Disorder, manic episode with mixed features  - Discontinued PTA Escitalopram 10 mg    - Lithium 300 mg daily   - Risperidone 0.5 mg at bedtime     #Alcohol Use Disorder, moderate   - GLORIA < 0.01 on admission. No withdrawal symptoms noted. MSSA discontinued 11/11  - Continue thiamine, multivitamine, folate supplementation  - Consider MI/CD treatment     -Pertinent Labs/Monitoring:   - EKG pending     Additional Plans:  - Patient will be treated in therapeutic milieu with appropriate individual and group therapies as described       Psychiatric Hospital Course:      Maria Del Carmen Sheikh was admitted to [facility] as a voluntary patient.    Legal  Signed in voluntarily (11/9)     Medications:  PCP had prescribed Lexapro 10 mg a few days before admission. HELD PTA Lexapro 10 mg given recent manic episode.   MSSA protocol was initiated on admission. But no PRNs required, MSSA scores have been 1-3. On 11/11, MSSA protocol discontinued.  11/11 - Started lithium 300 mg as mood stabilizer given manic symptoms prior to hospitalization and risperidone 0.5 mg at bedtime.      The risks, benefits, alternatives, and side effects were discussed and understood by the patient.      Medical Assessment and Plan     Medical diagnoses to be addressed this admission:    # Mildly elevated liver enzymes  - Suspect due to alcohol use  - Avoid Acetaminophen for 72 hours   - PCP follow up      # Mild abdominal pain  - attributed to menstrual cramps though bleeding has mostly resolved  - may be related to excessive alcohol use   - Continue to monitor      # Vitamin D deficiency   Vitamin D (11/9) 17 on admission   - Vitamin D3 capsule 125 mcg daily      Medical course: Patient was physically examined by the ED prior to being transferred to the unit and was found to be medically stable and appropriate for admission.       Consults:  none     Checklist     Legal Status:  Orders Placed This Encounter      Voluntary       Safety Assessment:   Behavioral Orders   Procedures    Cheeking Precautions (behavioral units)    Code 1 - Restrict to Unit    Routine Programming     As clinically indicated    Status 15     Every 15 minutes.        Risk Assessment:  Risk for harm is low.  Risk factors: substance use and family history  Protective factors: family and engaged in treatment      SIO: No    Dispo: TBD. Disposition pending clinical stabilization, medication optimization and development of an appropriate discharge plan.        Attestations     Med Student: Sony Bazan MS4  Visiting Medical Student     Resident/Fellow Attestation   I, Celine Hunt DO, was present with the medical/LYDIA student who participated in the service and in the documentation of the note.  I have verified the history and personally performed the physical exam and medical decision making.  I agree with the assessment and plan of care as documented in the note.      Celine Hunt DO  PGY1  Date of Service (when I saw the patient): 11/11/24
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
## 2024-11-11 NOTE — PLAN OF CARE
IP Treatment Plan    Client's Name: Maria Del Carmen Sheikh  YOB: 1990      Treatment Plan Date: November 11, 2024      Anticipated number of sessions for this episode of care: 2-3    Current Concerns/Problem Areas:    Goal 1: Discuss and understand the skills essential to maintaining a substance free life Establish and develop 1 new coping skill for depression to improve mood and overall well-being and Identify 1-2 reasons to obtain and maintain sobriety      Intervention(s)    Coached on coping techniques/relaxation skills to help improve distress tolerance and managing intense emotions. , Identified and practiced coping skills, Discussed TIPP (body temperature, intense exercise, PMR), Engaged in relaxation training (e.g. meditation, progressive muscle relaxation, etc.), Identified stress relief practices, and Explored strategies for self-soothing

## 2024-11-11 NOTE — PROVIDER NOTIFICATION
11/11/24 1207   Individualization/Patient Specific Goals   Patient Personal Strengths expressive of needs;expressive of emotions;positive vocational history;positive educational history   Patient Vulnerabilities lacks insight into illness;substance abuse/addiction   Interprofessional Rounds   Summary New admit. Admitted due to concern for psychosis in the context of psychosocial stressors including work and stress from the recent elections. Pt is hospitalized voluntarily.   Participants CTC;psychiatrist;nursing;other (see comments)   Behavioral Team Discussion   Participants Dr. Bolden, Isaiah Vicente RN; Barbara Louie [facility]; medical students   Progress Initial assessment   Anticipated length of stay 3-7 days   Continued Stay Criteria/Rationale Symptom stabilization, medication management, care coordination   Medical/Physical See H&P   Precautions See below   Plan Psychiatric assessment/Medication management. Therapeutic Milieu. Individual care planning and after care planning. Patient to participate in unit groups and activities. Individual and group support on unit.   Safety Plan Completed by unit therapist prior to discharge   Anticipated Discharge Disposition home or self-care     PRECAUTIONS AND SAFETY    Behavioral Orders   Procedures    Cheeking Precautions (behavioral units)    Code 1 - Restrict to Unit    Elopement precautions    Routine Programming     As clinically indicated    Status 15     Every 15 minutes.       Safety  Safety WDL: WDL  Patient Location: bathroom, dining room, hallway, lounge, patient room, own  Observed Behavior: calm, sitting, sleeping, walking  Observed Behavior (Comment): caml and watching TV  Safety Measures: safety rounds completed  Diversional Activity: television  Suicidality: Status 15  Withdrawal: monitor withdrawal process

## 2024-11-11 NOTE — PLAN OF CARE
Problem: Sleep Disturbance  Goal: Adequate Sleep/Rest  Outcome: Progressing   Goal Outcome Evaluation:    Patient appears to have slept a total of 6.75 hours.     MSSA: 1    Safety/environment checks conducted every 15 minutes with no further concerns noted. Denies all withdrawal symptoms and no signs observed.
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
## 2024-11-11 NOTE — CONSULTS
Met with patient to discuss possible DERICK treatment options and to possibly complete an assessment. Assessment has been completed at this time and patient is being recommended:         Recommendations: Residential Treatment      Referrals/ Alternatives:  [facility] Team for Referrals  Phone: [phone]  Fax: [phone]  [email or web address]     [place]  Phone: [phone]  Efax: [phone]  [facility] [address] [address]  [email or web address]     [place]  [address] [facility] [address]  Phone: [phone]  Fax: [phone]        [address] [address]  Phone: [phone]  Phone: [phone]  Fax: [phone]  Fax: [phone]  [email or web address]    Referrals are being made at this time.  Patient is aware of the referrals and is in agreement. Patient gave verbal LUZ for the above programs.     BRENDA Longoria on 11/11/2024 at 3:13 PM

## 2024-11-11 NOTE — PLAN OF CARE
"  Problem: Adult Inpatient Plan of Care  Goal: Plan of Care Review  Description: The Plan of Care Review/Shift note should be completed every shift.  The Outcome Evaluation is a brief statement about your assessment that the patient is improving, declining, or no change.  This information will be displayed automatically on your shift  note.  Outcome: Progressing  Flowsheets (Taken 11/11/2024 1437)  Plan of Care Reviewed With: patient  Goal: Patient-Specific Goal (Individualized)  Description: You can add care plan individualizations to a care plan. Examples of Individualization might be:  \"Parent requests to be called daily at 9am for status\", \"I have a hard time hearing out of my right ear\", or \"Do not touch me to wake me up as it startles  me\".  Outcome: Progressing  Goal: Absence of Hospital-Acquired Illness or Injury  Outcome: Progressing  Intervention: Prevent Skin Injury  Recent Flowsheet Documentation  Taken 11/11/2024 0724 by Mitzi Loja RN  Body Position: position changed independently  Goal: Optimal Comfort and Wellbeing  Outcome: Progressing  Intervention: Provide Person-Centered Care  Recent Flowsheet Documentation  Taken 11/11/2024 0724 by Mitzi Loja RN  Trust Relationship/Rapport:   care explained   choices provided   emotional support provided   empathic listening provided   questions answered   reassurance provided   thoughts/feelings acknowledged  Goal: Readiness for Transition of Care  Outcome: Progressing       Goal Outcome Evaluation:    Plan of Care Reviewed With: patient     Today, patient presented with a calm mood and a pleasant affect. She was joyfully answering all her psych assessment questions. She denied pain. She denied all MH psych symptoms and contracted for safety. She said \"It is going to be a better day\". Patient's hygiene was appropriate. Her fluid and food intake was adequate. She ate 100% of her meal. Patient attended group x2 during. She said she enjoyed the " group ;meetings. There was an order for UA. Urine sample was obtained from the patient and sent to lab. It has not been returned yet. There were some medications added to the patient's scheduled medication. Today, patient received her first dose of Lithium 300 mg. There were no safety or behavioral concerns noted this shift.

## 2024-11-12 LAB
ANA SER QL IF: NEGATIVE
B BURGDOR IGG+IGM SER QL: 0.06

## 2024-11-12 PROCEDURE — 250N000013 HC RX MED GY IP 250 OP 250 PS 637

## 2024-11-12 PROCEDURE — 250N000013 HC RX MED GY IP 250 OP 250 PS 637: Performed by: PSYCHIATRY & NEUROLOGY

## 2024-11-12 PROCEDURE — 124N000002 HC R&B MH UMMC

## 2024-11-12 PROCEDURE — 99232 SBSQ HOSP IP/OBS MODERATE 35: CPT | Mod: GC | Performed by: STUDENT IN AN ORGANIZED HEALTH CARE EDUCATION/TRAINING PROGRAM

## 2024-11-12 PROCEDURE — 97150 GROUP THERAPEUTIC PROCEDURES: CPT | Mod: GO

## 2024-11-12 RX ORDER — LITHIUM CARBONATE 300 MG/1
600 TABLET, FILM COATED, EXTENDED RELEASE ORAL AT BEDTIME
Status: DISCONTINUED | OUTPATIENT
Start: 2024-11-12 | End: 2024-11-12

## 2024-11-12 RX ORDER — LITHIUM CARBONATE 300 MG/1
300 TABLET, FILM COATED, EXTENDED RELEASE ORAL AT BEDTIME
Status: COMPLETED | OUTPATIENT
Start: 2024-11-12 | End: 2024-11-12

## 2024-11-12 RX ORDER — LITHIUM CARBONATE 300 MG/1
600 TABLET, FILM COATED, EXTENDED RELEASE ORAL AT BEDTIME
Status: DISCONTINUED | OUTPATIENT
Start: 2024-11-13 | End: 2024-11-18

## 2024-11-12 RX ADMIN — LITHIUM CARBONATE 300 MG: 300 TABLET, EXTENDED RELEASE ORAL at 08:30

## 2024-11-12 RX ADMIN — THIAMINE HCL TAB 100 MG 100 MG: 100 TAB at 08:30

## 2024-11-12 RX ADMIN — FOLIC ACID 1 MG: 1 TABLET ORAL at 08:30

## 2024-11-12 RX ADMIN — Medication 125 MCG: at 08:30

## 2024-11-12 RX ADMIN — RISPERIDONE 0.5 MG: 0.5 TABLET, FILM COATED ORAL at 21:30

## 2024-11-12 RX ADMIN — LITHIUM CARBONATE 300 MG: 300 TABLET, EXTENDED RELEASE ORAL at 21:30

## 2024-11-12 RX ADMIN — Medication 1 TABLET: at 08:30

## 2024-11-12 ASSESSMENT — ACTIVITIES OF DAILY LIVING (ADL)
ADLS_ACUITY_SCORE: 0
ADLS_ACUITY_SCORE: 0
DRESS: INDEPENDENT;SCRUBS (BEHAVIORAL HEALTH)
ADLS_ACUITY_SCORE: 0
ORAL_HYGIENE: INDEPENDENT
ADLS_ACUITY_SCORE: 0
HYGIENE/GROOMING: INDEPENDENT
ADLS_ACUITY_SCORE: 0

## 2024-11-12 NOTE — PLAN OF CARE
"Problem: Anxiety  Goal: Anxiety Reduction or Resolution  Outcome: Progressing     Problem: Depression  Goal: Decreased Depression Symptoms  Outcome: Progressing   Goal Outcome Evaluation:    Pt was up early, active and social in the milieu.  Pt was observed watching TV with peers. Pt was alert and oriented x 4, pleasant and cooperative with staff. VS stable. Affect is labile/flat. Pt reports great appetite, added she is part of \"the clean plate club\". She ate about 100% of breakfast and % of lunch. Pt     Pt checked in as doing okay, rated anxiety at 4 and depression at 3 with 10 being worst. Pt reported Pt rated overall mood at good.  Pt denies SI/SIB/HI. Denies visual and auditory hallucinations. Pt declined to set goal for today.    Pt was medication compliant, denied medication side effects and medical concerns. No PRN requested and received --    Update order - Pt lithium level was adjusted as of 11.12.24 at 0937 to 600 mg daily at bedtime. Lithium lab level check scheduled for 11.17.24 in the AM.     MSSA was discontinued as of 11.12.24 due to low scores and no further require.     "

## 2024-11-12 NOTE — CARE PLAN
"  Rehab Group    Start time: 1010  End time: 1130  Patient time total: 65 minutes    attended partial group    #4 attended   Group Type: OT Clinic   Group Topic Covered: activity therapy and coping skills     Group Session Detail:  Coping skill exploration, creative expression within personally meaningful activities, and observation of social, cognitive, and kinesthetic performance skills     Patient Response/Contribution:  positive affect, cooperative with task, and organized     Patient Detail:  Expressed interest to initiate a self-directed project. IND to gather materials, organize work space, and sequence a detailed beading task to completion. Created a bracelet with the phrase \"Maria Del Carmen is a Warrior\". Accepted positive feedback from writer, although stated that she hopes she will not get in trouble for the word 'warrior'. Did not elaborate.  Demonstrated fair organization and frustration tolerance. Engaged in appropriate conversation with peers upon approach.         [id number] OT Group (2 or more in attendance)      Patient Active Problem List   Diagnosis    Exposure to bat without known bite    Eczema    Hand dermatitis    Abnormal cervical Papanicolaou smear, unspecified abnormal pap finding    Papanicolaou smear of cervix with low grade squamous intraepithelial lesion (LGSIL)    Psychosis, unspecified psychosis type (H)    Paranoia (H)    Delusional ideas (H)    Acute psychosis (H)       "

## 2024-11-12 NOTE — PROGRESS NOTES
"  ----------------------------------------------------------------------------------------------------------  [facility]  Psychiatry Progress Note  Hospital Day #3     Interim History:     The patient's care was discussed with the treatment team and chart notes were reviewed.    Vitals: VSS  Sleep: 7 hours (11/12/24 0600)  Scheduled medications: Took all scheduled medications as prescribed  Psychiatric PRN medications: No psychiatric prns given    Staff Report:   No acute events or safety concerns overnight. Patient was reported to be medication compliant with an improved mood from baseline. Patient is reporting that her anxiety is also improving at this time.      Subjective:     Patient Interview:  Patient was interviewed in the room. She says that \"it's going better\". Since starting Lithium, feels good. Denying any nausea or tremors or any additional side effects from the risperidone and the lithium. Endorses that her appetite has been better. Slept 7 hours without any disruption and feels well-rested.     Current anxiety stems from next steps - disposition. Rates her anxiety at 3/4 of 10. Finds OT groups beneficial. Endorses less depression today and feeling more hopeful overall. She says she has been occupying herself with reading her novel. Patient amenable to increasing lithium to 600 mg today and then drawing a level this weekend.    ROS:  Patient has no bothersome physical symptoms  Patient denies acute concerns     Objective:     Vitals:  /81 (BP Location: Left arm, Patient Position: Sitting, Cuff Size: Adult Regular)   Pulse 64   Temp 97.5  F (36.4  C) (Oral)   Resp 18   Ht 1.753 m (5' 9\")   Wt 83.4 kg (183 lb 14.4 oz)   LMP 11/06/2024   SpO2 100%   BMI 27.16 kg/m      Allergies:  Allergies   Allergen Reactions    Ibuprofen Hives    Cefpodoxime Nausea and Vomiting    Metronidazole Nausea and Vomiting     Other reaction(s): Dizziness       Current " Medications:  Scheduled:  Current Facility-Administered Medications   Medication Dose Route Frequency Provider Last Rate Last Admin    alum & mag hydroxide-simethicone (MAALOX) suspension 30 mL  30 mL Oral Q4H PRN Dagher, Ramez, MD        cholecalciferol (VITAMIN D3) capsule 125 mcg  125 mcg Oral Daily Celine Hunt, DO   125 mcg at 11/11/24 1321    folic acid (FOLVITE) tablet 1 mg  1 mg Oral Daily Dagher, Ramez, MD   1 mg at 11/11/24 0756    hydrOXYzine HCl (ATARAX) tablet 25 mg  25 mg Oral Q4H PRN Dagher, Ramez, MD   25 mg at 11/10/24 1615    lithium ER (LITHOBID) CR tablet 300 mg  300 mg Oral Daily Celine Hunt,    300 mg at 11/11/24 1056    melatonin tablet 3 mg  3 mg Oral At Bedtime PRN Dagher, Ramez, MD   3 mg at 11/09/24 2057    multivitamin w/minerals (THERA-VIT-M) tablet 1 tablet  1 tablet Oral Daily Dagher, Ramez, MD   1 tablet at 11/11/24 0756    OLANZapine (zyPREXA) tablet 10 mg  10 mg Oral TID PRN Dagher, Ramez, MD        Or    OLANZapine (zyPREXA) injection 10 mg  10 mg Intramuscular TID PRN Dagher, Ramez, MD        polyethylene glycol (MIRALAX) Packet 17 g  17 g Oral Daily PRN Dagher, Ramez, MD   17 g at 11/09/24 0839    risperiDONE (risperDAL) tablet 0.5 mg  0.5 mg Oral At Bedtime Celine Hunt DO   0.5 mg at 11/11/24 2154    thiamine (B-1) tablet 100 mg  100 mg Oral Daily Dagher, Ramez, MD   100 mg at 11/11/24 0756       PRN:  Current Facility-Administered Medications   Medication Dose Route Frequency Provider Last Rate Last Admin    alum & mag hydroxide-simethicone (MAALOX) suspension 30 mL  30 mL Oral Q4H PRN Dagher, Ramez, MD        cholecalciferol (VITAMIN D3) capsule 125 mcg  125 mcg Oral Daily Celine Hunt, DO   125 mcg at 11/11/24 1321    folic acid (FOLVITE) tablet 1 mg  1 mg Oral Daily Dagher, Ramez, MD   1 mg at 11/11/24 0756    hydrOXYzine HCl (ATARAX) tablet 25 mg  25 mg Oral Q4H PRN Dagher, Ramez, MD   25 mg at 11/10/24 1615    lithium ER (LITHOBID) CR tablet 300 mg  300 mg Oral Daily Marilee  DO Celine   300 mg at 11/11/24 1056    melatonin tablet 3 mg  3 mg Oral At Bedtime PRN Dagher, Ramez, MD   3 mg at 11/09/24 2057    multivitamin w/minerals (THERA-VIT-M) tablet 1 tablet  1 tablet Oral Daily Dagher, Ramez, MD   1 tablet at 11/11/24 0756    OLANZapine (zyPREXA) tablet 10 mg  10 mg Oral TID PRN Dagher, Ramez, MD        Or    OLANZapine (zyPREXA) injection 10 mg  10 mg Intramuscular TID PRN Dagher, Ramez, MD        polyethylene glycol (MIRALAX) Packet 17 g  17 g Oral Daily PRN Dagher, Ramez, MD   17 g at 11/09/24 0839    risperiDONE (risperDAL) tablet 0.5 mg  0.5 mg Oral At Bedtime Celine Hunt DO   0.5 mg at 11/11/24 2154    thiamine (B-1) tablet 100 mg  100 mg Oral Daily Dagher, Ramez, MD   100 mg at 11/11/24 0756       Labs and Imaging:  New results:   Recent Results (from the past 24 hours)   EKG 12-lead, complete    Collection Time: 11/11/24  1:27 PM   Result Value Ref Range    Systolic Blood Pressure  mmHg    Diastolic Blood Pressure  mmHg    Ventricular Rate 60 BPM    Atrial Rate 60 BPM    GA Interval 112 ms    QRS Duration 108 ms     ms    QTc 416 ms    P Axis 56 degrees    R AXIS 66 degrees    T Axis 66 degrees    Interpretation ECG       Sinus rhythm  Normal ECG  No previous ECGs available     Treponema Abs w Reflex to RPR and Titer    Collection Time: 11/11/24  1:41 PM   Result Value Ref Range    Treponema Antibody Total Nonreactive Nonreactive   Erythrocyte sedimentation rate auto    Collection Time: 11/11/24  1:41 PM   Result Value Ref Range    Erythrocyte Sedimentation Rate 6 0 - 20 mm/hr   HIV Antigen Antibody Combo Cascade    Collection Time: 11/11/24  1:41 PM   Result Value Ref Range    HIV Antigen Antibody Combo Nonreactive Nonreactive   UA with Microscopic reflex to Culture    Collection Time: 11/11/24  2:58 PM    Specimen: Urine, Midstream   Result Value Ref Range    Color Urine Straw Colorless, Straw, Light Yellow, Yellow    Appearance Urine Clear Clear    Glucose Urine  "Negative Negative mg/dL    Bilirubin Urine Negative Negative    Ketones Urine Negative Negative mg/dL    Specific Gravity Urine 1.005 1.003 - 1.035    Blood Urine Small (A) Negative    pH Urine 7.0 5.0 - 7.0    Protein Albumin Urine Negative Negative mg/dL    Urobilinogen Urine Normal Normal, 2.0 mg/dL    Nitrite Urine Negative Negative    Leukocyte Esterase Urine Small (A) Negative    Bacteria Urine Few (A) None Seen /HPF    RBC Urine 1 <=2 /HPF    WBC Urine 3 <=5 /HPF    Squamous Epithelials Urine 5 (H) <=1 /HPF       Data this admission:  - CBC unremarkable  - CMP unremarkable  - TSH normal  - UDS negative  - Vit D level was low, 17  - Hgb A1c normal  - Lipids unremarkable  - Vit B12 normal  - Folate normal     Mental Status Exam:     Oriented to:  Grossly Oriented  General:  Awake and Alert  Appearance:  appears stated age  Behavior/Attitude:  Calm and Cooperative  Eye Contact: Appropriate  Psychomotor: Tics no catatonia present  Speech:  appropriate volume/tone  Language: Fluent in English with appropriate syntax and vocabulary.  Mood:  \"anxious\"  Affect:  appropriate and congruent with mood  Thought Process:  linear and coherent  Thought Content:   No SI/HI/AH/VH; No apparent delusions  Associations:  intact  Insight:  partial due to mood episode  Judgment:  fair due to recognition of mental illness  Impulse control: good  Attention Span:  grossly intact  Concentration:  grossly intact  Recent and Remote Memory:  not formally assessed  Fund of Knowledge: average  Muscle Strength and Tone: normal  Gait and Station: Normal     Psychiatric Assessment     Maria Del Carmen Sheikh is a 34 year old female with previous psychiatric diagnoses of AUD, Cannabis use disorder, [place], MDD admitted from the ER on 11/09/2024 due to concern for psychosis and roselia in the context of psychosocial stressors including stress from the recent elections and increasing work stress over the past 6 months. On admission, she presents with decreased " need for sleep, racing thoughts, disorganization, delusions of paranoia thinking that she is being monitored by her work, poor insight and judgment.  Mental status is noted is notable for somewhat pressured speech with tangential and rambling thought process that was difficult to follow at times as well as odd facial expressions.  She described a pattern of decompensation precipitated by a doubling of her work load over the past 5 months in addition to a recent promotion  3 weeks ago where she was staying up all night to try to catch up on missed emails and phone calls.  This week she was facing a potential reprimand at work because she was unable to fulfill her duties and upon the advice of her uncle, agreeing to get evaluated.   Additionally her picture is further complicated by comorbid moderate alcohol use disorder. Collateral reports noted that her behavior is not typical for the last month, and believe she has had increasing alcohol use especially in the last week as she was found to be intoxicated Wednesday morning by family. Increased cannabis use may also be contributing.  This is the patient's first psychiatric hospitalization.      Given her presentation there is concern for substance-induced mood changes and/or bipolar disorder, with current episode hypomania/roselia the most likely diagnosis at this time.  On evaluation there were no signs of overt psychosis, patient did not appear to be responding to internal stimuli, and patient discussed paranoia in the context of her job and not being able to fulfill her work duties as well as stress due to recent election results.  It is unclear if collateral reports of disorganization and paranoia are secondary to a budding primary psychotic disorder or in the context of hypo/roselia and further evaluation would be needed to discern.  At this time patient warrants inpatient psychiatric hospitalization to maintain her safety.      Psychiatric Plan by Diagnosis       Today's changes:  - Increased lithium to 600 mg today      # Bipolar I Disorder, manic episode with mixed features  - Lithium 600 mg at bedtime   - Risperidone 0.5 mg at bedtime     #Alcohol Use Disorder, moderate   - GLORIA < 0.01 on admission. No withdrawal symptoms noted. MSSA discontinued 11/11  - Continue thiamine, multivitamin, folate supplementation  - Per CD recs: inpatient residential tx  - Pt interested in starting naltrexone     - Pertinent Labs/Monitoring:   - QTc (11/11) 416 msec  - Treponema Ab nonreactive, ESR wnl, JAY negative, HIV nonreactive   - NMDA receptor Ab pending      Additional Plans:  - Patient will be treated in therapeutic milieu with appropriate individual and group therapies as described       Psychiatric Hospital Course:      Maria Del Carmen Sheikh was admitted to [facility] as a voluntary patient.    Legal  Signed in voluntarily (11/9)     Medications:  PCP had prescribed Lexapro 10 mg a few days before admission. HELD PTA Lexapro 10 mg given recent manic episode.   MSSA protocol was initiated on admission. But no PRNs required, MSSA scores have been 1-3. On 11/11, MSSA protocol discontinued.  11/11 - Started lithium 300 mg as mood stabilizer given manic symptoms prior to hospitalization and risperidone 0.5 mg at bedtime.   11/12 - Increased Lithium to 600 mg at bedtime     The risks, benefits, alternatives, and side effects were discussed and understood by the patient.      Medical Assessment and Plan     Medical diagnoses to be addressed this admission:    # Mildly elevated liver enzymes  - Suspect due to alcohol use  - Avoid Acetaminophen for 72 hours   - PCP follow up      # Mild abdominal pain  - attributed to menstrual cramps though bleeding has mostly resolved  - may be related to excessive alcohol use   - Continue to monitor      # Vitamin D deficiency   Vitamin D (11/9) 17 on admission   - Vitamin D3 capsule 125 mcg daily      Medical course: Patient was physically examined by the ED  prior to being transferred to the unit and was found to be medically stable and appropriate for admission.      Consults:  none     Checklist     Legal Status:  Orders Placed This Encounter      Voluntary       Safety Assessment:   Behavioral Orders   Procedures    Cheeking Precautions (behavioral units)    Code 1 - Restrict to Unit    Routine Programming     As clinically indicated    Status 15     Every 15 minutes.        Risk Assessment:  Risk for harm is low.  Risk factors: substance use and family history  Protective factors: family and engaged in treatment      SIO: No    Dispo: TBD. Disposition pending clinical stabilization, medication optimization and development of an appropriate discharge plan.        Attestations     Med Student: Sony Bazan MS4  Visiting Medical Student     Resident/Fellow Attestation   I, Celine Hunt DO, was present with the medical/LYDIA student who participated in the service and in the documentation of the note.  I have verified the history and personally performed the physical exam and medical decision making.  I agree with the assessment and plan of care as documented in the note.      Celine Hunt DO  PGY1  Date of Service (when I saw the patient): 11/12/24

## 2024-11-12 NOTE — PLAN OF CARE
Assessment/Intervention/Current Symtoms and Care Coordination:  Chart reviewed and patient met with team,   Discussed patient progress, symptomology, and response to treatment.  Discussed the discharge plan and any potential impediments to discharge.    Patient reports sleeping has improved since admitted. She has been compliant with meds,   Patient was out in lounge last evening but minimally social with peers.    Patient completed CD assessment.  Will await reviews.  Patient has requested FMLA forms be completed for work. Dr. Hunt will complete.     Discharge Plan or Goal:  Residential [facility] Treatment  Psychiatry F/U     Barriers to Discharge:  Severity of symptoms  Need for med mgmt per MD's  Placement at Residential MI/CD Tx    Referral Status:  Referrals/ Alternatives:  [facility] Team for Referrals  Phone: [phone]  Fax: [phone]  [email or web address]     [facility]  [address] [address]  Phone: [phone]    Efax: [phone]    [email or web address]     [place]  [id number] [place] [address]  Phone: [phone]  Fax: [phone]     [place] [id number]  Phone: [phone] Fax: [phone]  Fax: [phone]  [email or web address]     Legal Status:    County:  File Number:   Start and expiration date of commitment:     Contacts:  Primary Physician: Aaliyah Vo, ROMERO Sena PA-C as Assigned PCP  Jossy Adam PA-C as Physician Assistant (Family Medicine)  Lexii Mustafa, , CHW as Community Health Worker    Family Members: Odessa Godinez-[phone]; Cleve Sheikh,[phone]     Upcoming Meetings and Dates/Important Information and next steps:  Team Update: Monday

## 2024-11-12 NOTE — PLAN OF CARE
Problem: Sleep Disturbance  Goal: Adequate Sleep/Rest  Outcome: Progressing   Goal Outcome Evaluation:    Patient appears to have slept a total of 7 hours. Safety/environment checks conducted every 15 minutes with no concerns noted. No complaints of pain/discomfort.

## 2024-11-12 NOTE — PLAN OF CARE
Patient was out visible, socially kept to herself, she was observed watching tv, she paced the hallway, she was medication compliant, patient was given aromatherapy for relaxation purposes and she voiced appreciation, mood she reported to have improved from baseline, her anxiety she reported to be mild and controlled, stated the lithium is helping regulate her mood well and feels like she is back to baseline, denied all other mental health symptoms.   Problem: Adult Behavioral Health Plan of Care  Goal: Absence of New-Onset Illness or Injury  Outcome: Progressing     Problem: Adult Inpatient Plan of Care  Goal: Optimal Comfort and Wellbeing  Outcome: Progressing   Goal Outcome Evaluation:    Plan of Care Reviewed With: patient

## 2024-11-12 NOTE — CARE PLAN
Rehab Group    Start time: 1330  End time: 1405  Patient time total: 35 minutes    attended full group    #1 attended   Group Type: occupational therapy   Group Topic Covered: balanced lifestyle, coping skills, and self-care     Group Session Detail:  Education was provided on various ways to take care of one's emotional, physical, social, mental, and occupational self as protective factors. Independent reflection, group discussion, and grounding activity provided.      Patient Response/Contribution:  positive affect, cooperative with task, and organized     Patient Detail:  Actively engaged in worksheet. Able to identify >2 self-care strategies within daily routine. Expressed a great deal of insight r/t anxiety and caring for her mental health. 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 Attentive throughout discussion and accepted additional ideas / feedback.         [id number] OT Group (2 or more in attendance)      Patient Active Problem List   Diagnosis    Exposure to bat without known bite    Eczema    Hand dermatitis    Abnormal cervical Papanicolaou smear, unspecified abnormal pap finding    Papanicolaou smear of cervix with low grade squamous intraepithelial lesion (LGSIL)    Psychosis, unspecified psychosis type (H)    Paranoia (H)    Delusional ideas (H)    Acute psychosis (H)

## 2024-11-13 LAB — NMDAR IGG TITR SER IF: NORMAL {TITER}

## 2024-11-13 PROCEDURE — 250N000013 HC RX MED GY IP 250 OP 250 PS 637

## 2024-11-13 PROCEDURE — 124N000002 HC R&B MH UMMC

## 2024-11-13 PROCEDURE — 90853 GROUP PSYCHOTHERAPY: CPT

## 2024-11-13 PROCEDURE — 99232 SBSQ HOSP IP/OBS MODERATE 35: CPT | Mod: GC | Performed by: STUDENT IN AN ORGANIZED HEALTH CARE EDUCATION/TRAINING PROGRAM

## 2024-11-13 PROCEDURE — 250N000013 HC RX MED GY IP 250 OP 250 PS 637: Performed by: PSYCHIATRY & NEUROLOGY

## 2024-11-13 PROCEDURE — 97150 GROUP THERAPEUTIC PROCEDURES: CPT | Mod: GO

## 2024-11-13 RX ADMIN — LITHIUM CARBONATE 600 MG: 300 TABLET, EXTENDED RELEASE ORAL at 21:22

## 2024-11-13 RX ADMIN — RISPERIDONE 0.5 MG: 0.5 TABLET, FILM COATED ORAL at 21:22

## 2024-11-13 RX ADMIN — NALTREXONE HYDROCHLORIDE 25 MG: 50 TABLET, FILM COATED ORAL at 13:21

## 2024-11-13 RX ADMIN — FOLIC ACID 1 MG: 1 TABLET ORAL at 08:25

## 2024-11-13 RX ADMIN — Medication 1 TABLET: at 08:25

## 2024-11-13 RX ADMIN — THIAMINE HCL TAB 100 MG 100 MG: 100 TAB at 08:25

## 2024-11-13 RX ADMIN — Medication 125 MCG: at 08:25

## 2024-11-13 ASSESSMENT — ACTIVITIES OF DAILY LIVING (ADL)
ADLS_ACUITY_SCORE: 0
DRESS: INDEPENDENT;SCRUBS (BEHAVIORAL HEALTH)
HYGIENE/GROOMING: INDEPENDENT
ADLS_ACUITY_SCORE: 0
ORAL_HYGIENE: INDEPENDENT
ADLS_ACUITY_SCORE: 0

## 2024-11-13 NOTE — CARE PLAN
Rehab Group    Start time: 1115  End time: 1400  Patient time total: 85 minutes    attended full group    #6 attended   Group Type: OT Clinic   Group Topic Covered: activity therapy and coping skills     Group Session Detail:  Coping skill exploration, creative expression within personally meaningful activities, and observation of social, cognitive, and kinesthetic performance skills     Patient Response/Contribution:  positive affect, organized, and socially appropriate     Patient Detail:  Washed hands immediately upon start and end of group. IND to gather materials, organize work space, and sequence a painting task to completion. Demonstrated fair organization and focus. Engaged in appropriate conversation with peers.         [id number] OT Group (2 or more in attendance)      Patient Active Problem List   Diagnosis    Exposure to bat without known bite    Eczema    Hand dermatitis    Abnormal cervical Papanicolaou smear, unspecified abnormal pap finding    Papanicolaou smear of cervix with low grade squamous intraepithelial lesion (LGSIL)    Psychosis, unspecified psychosis type (H)    Paranoia (H)    Delusional ideas (H)    Acute psychosis (H)

## 2024-11-13 NOTE — PLAN OF CARE
BEH IP Unit Acuity Rating Score (UARS)  Patient is given one point for every criteria they meet.    CRITERIA SCORING   On a 72 hour hold, court hold, committed, stay of commitment, or revocation. 0    Patient LOS on BEH unit exceeds 20 days. 0  LOS: 4   Patient under guardianship, 55+, otherwise medically complex, or under age 11. 0   Suicide ideation without relief of precipitating factors. 0   Current plan for suicide. 0   Current plan for homicide. 0   Imminent risk or actual attempt to seriously harm another without relief of factors precipitating the attempt. 0   Severe dysfunction in daily living (ex: complete neglect for self care, extreme disruption in vegetative function, extreme deterioration in social interactions). 1   Recent (last 7 days) or current physical aggression in the ED or on unit. 0   Restraints or seclusion episode in past 72 hours. 0   Recent (last 7 days) or current verbal aggression, agitation, yelling, etc., while in the ED or unit. 0   Active psychosis. 1   Need for constant or near constant redirection (from leaving, from others, etc).  0   Intrusive or disruptive behaviors. 0   Patient requires 3 or more hours of individualized nursing care per 8-hour shift (i.e. for ADLs, meds, therapeutic interventions). 0   TOTAL 2

## 2024-11-13 NOTE — PLAN OF CARE
Assessment/Intervention/Current Symtoms and Care Coordination:  Chart reviewed and patient met with team,   Discussed patient progress, symptomology, and response to treatment.  Discussed the discharge plan and any potential impediments to discharge.    Patient reports she thinks that she is feeling  better,sleeping has improved since admitted. She has been compliant with meds,   Patient has attended groups but minimally social with peers.    Patient completed CD assessment. [place] reviewing assessment. Received VM from [place] at [facility]- awaiting call back.  [place] forms completed - sent to [facility].   Forms given to patient      Discharge Plan or Goal:  Residential [facility] Treatment  Psychiatry F/U  Therapy     Barriers to Discharge:  Severity of symptoms  Need for med mgmt per MD's  Placement at Residential MI/CD Tx    Referral Status:  Referrals/ Alternatives:  [place] Access Team for Referrals  Phone: [phone]  Fax: [phone]  [email or web address]     [address] [address]  Phone: [phone]    Efax: [phone]    [email or web address]     [place]  [address] [facility] [address]  Phone: [phone]  Fax: [phone]     [address] [address]  Phone: [phone] Fax: [phone]  Fax: [phone]  [email or web address]     Legal Status:    County:  File Number:   Start and expiration date of commitment:     Contacts:  Primary Physician: Aaliyah Vo, ROMERO Sena PA-C as Assigned PCP  Jossy Adam PA-C as Physician Assistant (Family Medicine)  Lexii Mustafa, , CHW as Community Health Worker    Family Members: [place]essa Godinez-[phone]; Cleve Sheikh,[phone]     Upcoming Meetings and Dates/Important Information and next steps:  Team Update: Monday

## 2024-11-13 NOTE — PLAN OF CARE
Group Attendance:  attended partial group    Time session began: 1315  Time session ended: 1345  Patient's total time in group: 20    Total # Attendees   3   Group Type psychotherapeutic and psychoeducational     Group Topic Covered insight improvement, goals and motivation, and relationships and boundaries     Group Session Detail Exploring values by engaging in group discussion to help individuals gain a deeper understanding of their core beliefs and principles. Completed worksheet on circles of personal influence to promote aligning behaviors with core principles, fostering a sense of integrity and purpose.      Patient's response to the group topic/interactions:  positive affect, cooperative with task, supportive of peers, and listened actively     Patient Details: Pt joined group partway through activity but quickly became an engaged participant. Pt shared her values are ethics, nunu and self-care.            [address] - Group psychotherapy - 1 Session  Patient Active Problem List   Diagnosis    Exposure to bat without known bite    Eczema    Hand dermatitis    Abnormal cervical Papanicolaou smear, unspecified abnormal pap finding    Papanicolaou smear of cervix with low grade squamous intraepithelial lesion (LGSIL)    Psychosis, unspecified psychosis type (H)    Paranoia (H)    Delusional ideas (H)    Acute psychosis (H)

## 2024-11-13 NOTE — PROGRESS NOTES
Summary:   Maria Del Carmen has been visible in the milieu.Presents with bright affect.Mood is calm.Adequate food and fluid intake.Attended OT groups.Compliant with medication.No prn given this shift.Sociable with select peers.No escalation of behaviors.No safety concerns.Denies all psych symptoms.  Patient contracted for safety.  Temp: 97.3  F (36.3  C) Temp src: Tympanic BP: 132/85 Pulse: 73   Resp: 16 SpO2: 99 % O2 Device: None (Room air)

## 2024-11-13 NOTE — PLAN OF CARE
Problem: Sleep Disturbance  Goal: Adequate Sleep/Rest  Outcome: Progressing   Goal Outcome Evaluation:    Patient appears to have slept a total of 6.75 hours. Safety/environment checks conducted every 15 minutes with no concerns noted. No complaints of pain/discomfort.

## 2024-11-13 NOTE — PROGRESS NOTES
"  Rehab Group    Start time: 1015  End time: 1105  Patient time total: 50 minutes    attended full group    #2 attended   Group Type: dance movement   Group Topic Covered:  integration       Group Session Detail:  Patients used body awareness to support integration of self, find balance and an openness both physically, mentally and emotionally. Patients engaged in physical stretching with awareness and focus.     Patient Response/Contribution:  positive affect, organized, listened actively, attentive, and actively engaged       Patient Detail:    Prior to the session, patient was engaged in organizing a cabinet [facility] the [facility].  She continued this type of meticulous detail in the dance/movement therapy session with some ability to moved from \"choreographed\" steps to increased freedom of expression, integrating what she \"knows\" about her body with how she can express herself through it.        No Charge-not enough group participants to bill      Patient Active Problem List   Diagnosis    Exposure to bat without known bite    Eczema    Hand dermatitis    Abnormal cervical Papanicolaou smear, unspecified abnormal pap finding    Papanicolaou smear of cervix with low grade squamous intraepithelial lesion (LGSIL)    Psychosis, unspecified psychosis type (H)    Paranoia (H)    Delusional ideas (H)    Acute psychosis (H)      "

## 2024-11-13 NOTE — PLAN OF CARE
Patient was isolative to room intermittently visible, talked with family on the phone stated her mother is coming to visit this Thursday, insight to situation has improved from baseline, she expressed that the Lexapro prescribed for her depression caused her roselia and with the overwhelm of the election she stated I was presenting as manic, she acknowledges the efficacy of the treatment and has noted a significant change in her mood, endorsed mild anxiety and improved mood, affect was consistent, she was reading a book in her room during assessment, writer was able to provide active listening.    Problem: Adult Inpatient Plan of Care  Goal: Optimal Comfort and Wellbeing  Outcome: Progressing     Problem: Adult Behavioral Health Plan of Care  Goal: Optimized Coping Skills in Response to Life Stressors  Outcome: Progressing  Flowsheets (Taken 11/12/2024 2110)  Optimized Coping Skills in Response to Life Stressors: making progress toward outcome   Goal Outcome Evaluation:    Plan of Care Reviewed With: patient

## 2024-11-13 NOTE — PROGRESS NOTES
"  ----------------------------------------------------------------------------------------------------------    Psychiatry Progress Note  Hospital Day #4     Interim History:     The patient's care was discussed with the treatment team and chart notes were reviewed.    Vitals: VSS  Sleep: 6.75 hours (24 0600)  Scheduled medications: Took all scheduled medications as prescribed  Psychiatric PRN medications: No psychiatric prns given    Staff Report:   No acute events or safety concerns overnight. Patient was reported to be medication compliant with an improved mood from baseline. Insight to situation has improved from baseline and is acknowledging efficacy of the treatment. Reporting mild anxiety but improved mood.   Subjective:     Patient Interview:  Patient interviewed in room. States attending groups has helped a lot. Still working on managing stressors around the unit with men being around. Endorses ongoing depression and anxiety, rated depression 4/10 and anxiety 3/10. States she feels more \"gathered\". Patient denies any auditory or visual hallucinations at this time. Patient denying any suicidal or homicidal ideation. Patient denies having any dark thoughts currently. Patient feels like the treatment has helped her symptoms.     Denies nausea and tremors post increase in medication last night. Reports holidays are hard for her because it is around the time her father , notes increase in symptoms especially during December.     Patient understands that we will be increasing her dose to lithium 600 mg bedtime and then will be drawing a level on the . Patient is amenable to the plan.       ROS:  Patient has no bothersome physical symptoms  Patient denies acute concerns     Objective:     Vitals:  /85 (BP Location: Left arm, Patient Position: Standing, Cuff Size: Adult Regular)   Pulse 73   Temp 97.3  F (36.3  C) (Tympanic)   Resp 16   " " 1.753 m (5' 9\")   Wt 85.1 kg (187 lb 11.2 oz)   LMP 11/06/2024   SpO2 99%   BMI 27.72 kg/m      Allergies:  Allergies   Allergen Reactions    Ibuprofen Hives    Cefpodoxime Nausea and Vomiting    Metronidazole Nausea and Vomiting     Other reaction(s): Dizziness       Current Medications:  Scheduled:  Current Facility-Administered Medications   Medication Dose Route Frequency Provider Last Rate Last Admin    alum & mag hydroxide-simethicone (MAALOX) suspension 30 mL  30 mL Oral Q4H PRN Dagher, Ramez, MD        cholecalciferol (VITAMIN D3) capsule 125 mcg  125 mcg Oral Daily Celine Hunt DO   125 mcg at 11/12/24 0830    folic acid (FOLVITE) tablet 1 mg  1 mg Oral Daily Dagher, Ramez, MD   1 mg at 11/12/24 0830    hydrOXYzine HCl (ATARAX) tablet 25 mg  25 mg Oral Q4H PRN Dagher, Ramez, MD   25 mg at 11/10/24 1615    lithium ER (LITHOBID) CR tablet 600 mg  600 mg Oral At Bedtime Monty Hayden MD        melatonin tablet 3 mg  3 mg Oral At Bedtime PRN Dagher, Ramez, MD   3 mg at 11/09/24 2057    multivitamin w/minerals (THERA-VIT-M) tablet 1 tablet  1 tablet Oral Daily Dagher, Ramez, MD   1 tablet at 11/12/24 0830    OLANZapine (zyPREXA) tablet 10 mg  10 mg Oral TID PRN Dagher, Ramez, MD        Or    OLANZapine (zyPREXA) injection 10 mg  10 mg Intramuscular TID PRN Dagher, Ramez, MD        polyethylene glycol (MIRALAX) Packet 17 g  17 g Oral Daily PRN Dagher, Ramez, MD   17 g at 11/09/24 0839    risperiDONE (risperDAL) tablet 0.5 mg  0.5 mg Oral At Bedtime Celine Hunt DO   0.5 mg at 11/12/24 2130    thiamine (B-1) tablet 100 mg  100 mg Oral Daily Dagher, Ramez, MD   100 mg at 11/12/24 0830       PRN:  Current Facility-Administered Medications   Medication Dose Route Frequency Provider Last Rate Last Admin    alum & mag hydroxide-simethicone (MAALOX) suspension 30 mL  30 mL Oral Q4H PRN Dagher, Ramez, MD        cholecalciferol (VITAMIN D3) capsule 125 mcg  125 mcg Oral Daily Celine Hunt DO   125 mcg at 11/12/24 " "0830    folic acid (FOLVITE) tablet 1 mg  1 mg Oral Daily Dagher, Ramez, MD   1 mg at 11/12/24 0830    hydrOXYzine HCl (ATARAX) tablet 25 mg  25 mg Oral Q4H PRN Dagher, Ramez, MD   25 mg at 11/10/24 1615    lithium ER (LITHOBID) CR tablet 600 mg  600 mg Oral At Bedtime Monty Hayden MD        melatonin tablet 3 mg  3 mg Oral At Bedtime PRN Dagher, Ramez, MD   3 mg at 11/09/24 2057    multivitamin w/minerals (THERA-VIT-M) tablet 1 tablet  1 tablet Oral Daily Dagher, Ramez, MD   1 tablet at 11/12/24 0830    OLANZapine (zyPREXA) tablet 10 mg  10 mg Oral TID PRN Dagher, Ramez, MD        Or    OLANZapine (zyPREXA) injection 10 mg  10 mg Intramuscular TID PRN Dagher, Ramez, MD        polyethylene glycol (MIRALAX) Packet 17 g  17 g Oral Daily PRN Dagher, Ramez, MD   17 g at 11/09/24 0839    risperiDONE (risperDAL) tablet 0.5 mg  0.5 mg Oral At Bedtime Celine Hunt DO   0.5 mg at 11/12/24 2130    thiamine (B-1) tablet 100 mg  100 mg Oral Daily Dagher, Ramez, MD   100 mg at 11/12/24 0830       Labs and Imaging:  New results:   No results found for this or any previous visit (from the past 24 hours).      Data this admission:  - CBC unremarkable  - CMP unremarkable  - TSH normal  - UDS negative  - Vit D level was low, 17  - Hgb A1c normal  - Lipids unremarkable  - Vit B12 normal  - Folate normal     Mental Status Exam:     Oriented to:  Grossly Oriented  General:  Awake and Alert  Appearance:  appears stated age  Behavior/Attitude:  Calm and Cooperative  Eye Contact: Appropriate  Psychomotor: Tics - eyes roll upward; no catatonia present  Speech:  appropriate volume/tone  Language: Fluent in English with appropriate syntax and vocabulary.  Mood:  \"good\"  Affect:  appropriate and congruent with mood  Thought Process:  linear and coherent  Thought Content:   No SI/HI/AH/VH; No apparent delusions  Associations:  intact  Insight:  partial due to mood episode  Judgment:  fair due to recognition of mental illness  Impulse control: " good  Attention Span:  grossly intact  Concentration:  grossly intact  Recent and Remote Memory:  not formally assessed  Fund of Knowledge: average  Muscle Strength and Tone: normal  Gait and Station: Normal     Psychiatric Assessment     Maria Del Carmen Sheikh is a 34 year old female with previous psychiatric diagnoses of AUD, Cannabis use disorder, [place], MDD admitted from the ER on 11/09/2024 due to concern for psychosis and roselia in the context of psychosocial stressors including stress from the recent elections and increasing work stress over the past 6 months. On admission, she presents with decreased need for sleep, racing thoughts, disorganization, delusions of paranoia thinking that she is being monitored by her work, poor insight and judgment.  Mental status is noted is notable for somewhat pressured speech with tangential and rambling thought process that was difficult to follow at times as well as odd facial expressions.  She described a pattern of decompensation precipitated by a doubling of her work load over the past 5 months in addition to a recent promotion  3 weeks ago where she was staying up all night to try to catch up on missed emails and phone calls.  This week she was facing a potential reprimand at work because she was unable to fulfill her duties and upon the advice of her uncle, agreeing to get evaluated.   Additionally her picture is further complicated by comorbid moderate alcohol use disorder. Collateral reports noted that her behavior is not typical for the last month, and they believe she has had increasing alcohol use especially in the last week prior to hospitalization as she was found to be intoxicated in the morning by family. Increased cannabis use may also be contributing.  This is the patient's first psychiatric hospitalization.      Given her presentation there is concern for substance-induced mood changes and/or bipolar disorder, with current episode hypomania/roselia the most likely  diagnosis at this time.  On evaluation there were no signs of overt psychosis, patient did not appear to be responding to internal stimuli, and patient discussed paranoia in the context of her job and not being able to fulfill her work duties as well as stress due to recent election results.  It is unclear if collateral reports of disorganization and paranoia are secondary to a budding primary psychotic disorder or in the context of hypo/roeslia and further evaluation would be needed to discern.  At this time patient warrants inpatient psychiatric hospitalization to maintain her safety.      Psychiatric Plan by Diagnosis      Today's changes:  - Increased lithium to 600 mg today   - Start naltrexone at 25 mg today     # Bipolar I Disorder, manic episode with mixed features  - Lithium 600 mg at bedtime   - Risperidone 0.5 mg at bedtime     #Alcohol Use Disorder, moderate   - GLORIA < 0.01 on admission. No withdrawal symptoms noted. MSSA discontinued 11/11  - Continue thiamine, multivitamin, folate supplementation  - Per CD recs: inpatient residential tx  - 25 mg naltrexone starting today    - Pertinent Labs/Monitoring:   - QTc (11/11) 416 msec  - Treponema Ab nonreactive, ESR wnl, JAY negative, HIV nonreactive   - NMDA receptor Ab pending      Additional Plans:  - Patient will be treated in therapeutic milieu with appropriate individual and group therapies as described       Psychiatric Hospital Course:      Maria Del Carmen Sheikh was admitted to [facility] as a voluntary patient.    Legal  Signed in voluntarily (11/9)     Medications:  PCP had prescribed Lexapro 10 mg a few days before admission. HELD PTA Lexapro 10 mg given recent manic episode.   MSSA protocol was initiated on admission. But no PRNs required, MSSA scores have been 1-3. On 11/11, MSSA protocol discontinued.  11/11 - Started lithium 300 mg as mood stabilizer given manic symptoms prior to hospitalization and risperidone 0.5 mg at bedtime.   11/12 - Increased  Lithium to 600 mg at bedtime     The risks, benefits, alternatives, and side effects were discussed and understood by the patient.      Medical Assessment and Plan     Medical diagnoses to be addressed this admission:    # Mildly elevated liver enzymes  - Suspect due to alcohol use  - Avoid Acetaminophen for 72 hours   - PCP follow up      # Mild abdominal pain  - attributed to menstrual cramps though bleeding has mostly resolved  - may be related to excessive alcohol use   - Continue to monitor      # Vitamin D deficiency   Vitamin D (11/9) 17 on admission   - Vitamin D3 capsule 125 mcg daily      Medical course: Patient was physically examined by the ED prior to being transferred to the unit and was found to be medically stable and appropriate for admission.      Consults:  none     Checklist     Legal Status:  Orders Placed This Encounter      Voluntary       Safety Assessment:   Behavioral Orders   Procedures    Cheeking Precautions (behavioral units)    Code 1 - Restrict to Unit    Routine Programming     As clinically indicated    Status 15     Every 15 minutes.        Risk Assessment:  Risk for harm is low.  Risk factors: substance use and family history  Protective factors: family and engaged in treatment      SIO: No    Dispo: TBD. Disposition pending clinical stabilization, medication optimization and development of an appropriate discharge plan.        Attestations     Med Student: Sony Bazna MS4  Visiting Medical Student     Resident/Fellow Attestation   I, Erlinda Daley, was present with the medical/LYDIA student who participated in the service and in the documentation of the note.  I have verified the history and personally performed the physical exam and medical decision making.  I agree with the assessment and plan of care as documented in the note.      Erlinda Daley  PGY1  Date of Service (when I saw the patient): 11/13/2024

## 2024-11-14 LAB
ATRIAL RATE - MUSE: 60 BPM
DIASTOLIC BLOOD PRESSURE - MUSE: NORMAL MMHG
INTERPRETATION ECG - MUSE: NORMAL
P AXIS - MUSE: 56 DEGREES
PR INTERVAL - MUSE: 112 MS
QRS DURATION - MUSE: 108 MS
QT - MUSE: 416 MS
QTC - MUSE: 416 MS
R AXIS - MUSE: 66 DEGREES
SYSTOLIC BLOOD PRESSURE - MUSE: NORMAL MMHG
T AXIS - MUSE: 66 DEGREES
VENTRICULAR RATE- MUSE: 60 BPM

## 2024-11-14 PROCEDURE — 250N000013 HC RX MED GY IP 250 OP 250 PS 637

## 2024-11-14 PROCEDURE — 90853 GROUP PSYCHOTHERAPY: CPT

## 2024-11-14 PROCEDURE — 97150 GROUP THERAPEUTIC PROCEDURES: CPT | Mod: GO

## 2024-11-14 PROCEDURE — 99232 SBSQ HOSP IP/OBS MODERATE 35: CPT | Mod: GC | Performed by: STUDENT IN AN ORGANIZED HEALTH CARE EDUCATION/TRAINING PROGRAM

## 2024-11-14 PROCEDURE — 124N000002 HC R&B MH UMMC

## 2024-11-14 PROCEDURE — 250N000013 HC RX MED GY IP 250 OP 250 PS 637: Performed by: PSYCHIATRY & NEUROLOGY

## 2024-11-14 RX ORDER — RISPERIDONE 1 MG/1
1 TABLET ORAL AT BEDTIME
Status: DISCONTINUED | OUTPATIENT
Start: 2024-11-14 | End: 2024-11-19 | Stop reason: HOSPADM

## 2024-11-14 RX ORDER — TRIAMCINOLONE ACETONIDE 5 MG/G
OINTMENT TOPICAL 2 TIMES DAILY
Status: DISCONTINUED | OUTPATIENT
Start: 2024-11-14 | End: 2024-11-19 | Stop reason: HOSPADM

## 2024-11-14 RX ORDER — NALTREXONE HYDROCHLORIDE 50 MG/1
50 TABLET, FILM COATED ORAL DAILY
Status: DISCONTINUED | OUTPATIENT
Start: 2024-11-15 | End: 2024-11-19 | Stop reason: HOSPADM

## 2024-11-14 RX ADMIN — RISPERIDONE 1 MG: 1 TABLET, FILM COATED ORAL at 21:26

## 2024-11-14 RX ADMIN — NALTREXONE HYDROCHLORIDE 25 MG: 50 TABLET, FILM COATED ORAL at 08:47

## 2024-11-14 RX ADMIN — Medication 1 TABLET: at 08:48

## 2024-11-14 RX ADMIN — LITHIUM CARBONATE 600 MG: 300 TABLET, EXTENDED RELEASE ORAL at 21:26

## 2024-11-14 RX ADMIN — THIAMINE HCL TAB 100 MG 100 MG: 100 TAB at 08:47

## 2024-11-14 RX ADMIN — Medication 125 MCG: at 08:47

## 2024-11-14 RX ADMIN — FOLIC ACID 1 MG: 1 TABLET ORAL at 08:47

## 2024-11-14 RX ADMIN — TRIAMCINOLONE ACETONIDE: 5 OINTMENT TOPICAL at 21:29

## 2024-11-14 ASSESSMENT — ACTIVITIES OF DAILY LIVING (ADL)
ADLS_ACUITY_SCORE: 0
HYGIENE/GROOMING: INDEPENDENT
ADLS_ACUITY_SCORE: 0

## 2024-11-14 NOTE — PLAN OF CARE
11/14/24 0022   Patient Belongings   Patient Belongings locker   Patient Belongings Put in Hospital Secure Location (Security or Locker, etc.) clothing   Belongings Search Yes   Clothing Search Yes   Second Staff Alicia HERNANDEZ     List of patient's belonging in the locker:     Shirt(6), Dresses(2), Pants( pair), sweat shirt(1), gloves( pair), book(5), and leggings( pair).      ..A               Admission:  I am responsible for any personal items that are not sent to the safe or pharmacy.  [facility] is not responsible for loss, theft or damage of any property in my possession.    Signature:  _________________________________ Date: _______  Time: _____                                              Staff Signature:  ____________________________ Date: ________  Time: _____      2nd Staff person, if patient is unable/unwilling to sign:    Signature: ________________________________ Date: ________  Time: _____     Discharge:  [facility] has returned all of my personal belongings:    Signature: _________________________________ Date: ________  Time: _____                                          Staff Signature:  ____________________________ Date: ________  Time: _____                        Goal Outcome Evaluation:

## 2024-11-14 NOTE — PLAN OF CARE
Assessment/Intervention/Current Symtoms and Care Coordination:  Chart reviewed and patient met with team,   Discussed patient progress, symptomology, and response to treatment.  Discussed the discharge plan and any potential impediments to discharge.    Patient continues to report she thinks that she is feeling  better,sleeping overall has improved since admitted. She has been compliant with meds,   Patient has attended groups but minimally social with peers. Patient expressed some concern over some patient's - unsure if they are criminals.    Patient completed CD assessment. [place] reviewing assessment. Received VM from [place] at [facility]- awaiting call back. Have left several messages for him and sent an email today.  FMLA forms completed - sent to [facility].   Forms given to patient     Patient's mother concerned that patient is behind on bills. Mom is unable to access bank acct  - was considering filing for power of  however writer suggested she come here to the unit and assist patient with paying her bills on line.     Discharge Plan or Goal:  Residential [facility]ICK Treatment  Psychiatry F/U  Therapy     Barriers to Discharge:  Severity of symptoms  Need for med mgmt per MD's  Placement at Residential MI/CD Tx    Referral Status:  Referrals/ Alternatives:  [place] Access Team for Referrals  Phone: [phone]  Fax: [phone]  [email or web address]     [address] [address]  Phone: [phone]    Efax: [phone]    [email or web address]     [place]  [address] [facility] [address]  Phone: [phone]  Fax: [phone]     [address] [address]  Phone: [phone] Fax: [phone]  Fax: [phone]  [email or web address]     Legal Status:    County:  File Number:   Start and expiration date of commitment:     Contacts:  Primary Physician: Aaliyah Vo CNP    Ghazal Sena PA-C as Assigned PCP  Jossy Adam PA-C as Physician Assistant (Family Medicine)  Lexii Mustafa, , CHW as Community Health Worker    Family Members: Odessa Godinez-[phone]; Cleve Sheikh,[phone]     Upcoming Meetings and Dates/Important Information and next steps:  Team Update: Monday

## 2024-11-14 NOTE — PROGRESS NOTES
"  ----------------------------------------------------------------------------------------------------------  [facility]  Psychiatry Progress Note  Hospital Day #5     Interim History:     The patient's care was discussed with the treatment team and chart notes were reviewed.    Vitals: VSS  Sleep: 6.75 hours (11/14/24 0600)  Scheduled medications: Took all scheduled medications as prescribed  Psychiatric PRN medications: No psychiatric prns given    Staff Report:   No acute events or safety concerns overnight. Patient was reported to be medication compliant with an improved mood from baseline. Patient presents with a bright affect, is currently endorsing 3/10 anxiety and 2/10 depression. Denying any SI or dark thoughts. Family visited over night and went well.      Subjective:     Patient Interview:  Patient is seen in the room. Patient says she had one nighttime awakening related to another patient throwing chairs around at 3AM. She says that her thoughts are more \"gathered and clear.\" She says she feels more organized and is reporting 3/10 anxiety and depression today. Patient expresses some feelings of guilt related to her substance use and the inciting events that brought her into the hospital, but states that she understands it is part of the grief process and that she is still willing to go to chemical dependency treatment.      Patient says she is anxious about being in the unit sometimes because she is not sure if she is around \"criminals\" but she understands the plan of finding her placement and drawing a lithium level on 11/17 before she is discharged.      In regards to her rash, patient says she struggles with excessively handwashing and reports she handwashes about 2 times per hour, which has occurred since 2020 and is responsible for the rash on her hands. She has a hx of dermatitis as well and uses triamanaclone to treat it usually. Patient says her " "darting eye movements are a function of her anxiety and that she does them when she is feeling more excitable or anxious. She is aware of them and is not particularly bothered by them.      Patient is amenable to plan of finding placement for chemical dependency treatment and then drawing a lithium level, as well as increasing her naltrexone to 50 mg and risperidone to 1 mg today.     ROS:  Patient has no bothersome physical symptoms  Patient denies acute concerns     Objective:     Vitals:  /85 (BP Location: Right arm, Patient Position: Sitting, Cuff Size: Adult Regular)   Pulse 65   Temp 97.8  F (36.6  C)   Resp 16   Ht 1.753 m (5' 9\")   Wt 85.8 kg (189 lb 1.6 oz)   LMP 11/06/2024   SpO2 100%   BMI 27.93 kg/m      Allergies:  Allergies   Allergen Reactions    Ibuprofen Hives    Cefpodoxime Nausea and Vomiting    Metronidazole Nausea and Vomiting     Other reaction(s): Dizziness       Current Medications:  Scheduled:  Current Facility-Administered Medications   Medication Dose Route Frequency Provider Last Rate Last Admin    alum & mag hydroxide-simethicone (MAALOX) suspension 30 mL  30 mL Oral Q4H PRN Dagher, Ramez, MD        cholecalciferol (VITAMIN D3) capsule 125 mcg  125 mcg Oral Daily Celine Hunt DO   125 mcg at 11/13/24 0825    folic acid (FOLVITE) tablet 1 mg  1 mg Oral Daily Dagher, Ramez, MD   1 mg at 11/13/24 0825    hydrOXYzine HCl (ATARAX) tablet 25 mg  25 mg Oral Q4H PRN Dagher, Ramez, MD   25 mg at 11/10/24 1615    lithium ER (LITHOBID) CR tablet 600 mg  600 mg Oral At Bedtime Monty Hayden MD   600 mg at 11/13/24 2122    melatonin tablet 3 mg  3 mg Oral At Bedtime PRN Dagher, Ramez, MD   3 mg at 11/09/24 2057    multivitamin w/minerals (THERA-VIT-M) tablet 1 tablet  1 tablet Oral Daily Dagher, Ramez, MD   1 tablet at 11/13/24 0825    naltrexone (DEPADE;REVIA) half-tab 25 mg  25 mg Oral Daily Erlinda Daley MD   25 mg at 11/13/24 1321    OLANZapine (zyPREXA) tablet 10 mg  10 mg Oral TID " PRN Dagher, Ramez, MD        Or    OLANZapine (zyPREXA) injection 10 mg  10 mg Intramuscular TID PRN Dagher, Ramez, MD        polyethylene glycol (MIRALAX) Packet 17 g  17 g Oral Daily PRN Dagher, Ramez, MD   17 g at 11/09/24 0839    risperiDONE (risperDAL) tablet 0.5 mg  0.5 mg Oral At Bedtime Celine Hunt, DO   0.5 mg at 11/13/24 2122    thiamine (B-1) tablet 100 mg  100 mg Oral Daily Dagher, Ramez, MD   100 mg at 11/13/24 0825       PRN:  Current Facility-Administered Medications   Medication Dose Route Frequency Provider Last Rate Last Admin    alum & mag hydroxide-simethicone (MAALOX) suspension 30 mL  30 mL Oral Q4H PRN Dagher, Ramez, MD        cholecalciferol (VITAMIN D3) capsule 125 mcg  125 mcg Oral Daily Celine Hunt, DO   125 mcg at 11/13/24 0825    folic acid (FOLVITE) tablet 1 mg  1 mg Oral Daily Dagher, Ramez, MD   1 mg at 11/13/24 0825    hydrOXYzine HCl (ATARAX) tablet 25 mg  25 mg Oral Q4H PRN Dagher, Ramez, MD   25 mg at 11/10/24 1615    lithium ER (LITHOBID) CR tablet 600 mg  600 mg Oral At Bedtime Monty Hayden MD   600 mg at 11/13/24 2122    melatonin tablet 3 mg  3 mg Oral At Bedtime PRN Dagher, Ramez, MD   3 mg at 11/09/24 2057    multivitamin w/minerals (THERA-VIT-M) tablet 1 tablet  1 tablet Oral Daily Dagher, Ramez, MD   1 tablet at 11/13/24 0825    naltrexone (DEPADE;REVIA) half-tab 25 mg  25 mg Oral Daily Erlinda Daley MD   25 mg at 11/13/24 1321    OLANZapine (zyPREXA) tablet 10 mg  10 mg Oral TID PRN Dagher, Ramez, MD        Or    OLANZapine (zyPREXA) injection 10 mg  10 mg Intramuscular TID PRN Dagher, Ramez, MD        polyethylene glycol (MIRALAX) Packet 17 g  17 g Oral Daily PRN Dagher, Ramez, MD   17 g at 11/09/24 0839    risperiDONE (risperDAL) tablet 0.5 mg  0.5 mg Oral At Bedtime Celine Hunt DO   0.5 mg at 11/13/24 2122    thiamine (B-1) tablet 100 mg  100 mg Oral Daily Dagher, Ramez, MD   100 mg at 11/13/24 0825       Labs and Imaging:  New results:   No results found for this or  "any previous visit (from the past 24 hours).      Data this admission:  - CBC unremarkable  - CMP unremarkable  - TSH normal  - UDS negative  - Vit D level was low, 17  - Hgb A1c normal  - Lipids unremarkable  - Vit B12 normal  - Folate normal     Mental Status Exam:     Oriented to:  Grossly Oriented  General:  Awake and Alert  Appearance:  appears stated age  Behavior/Attitude:  Calm and Cooperative  Eye Contact: Appropriate  Psychomotor: Tics - eyes roll upward; no catatonia present  Speech:  appropriate volume/tone  Language: Fluent in English with appropriate syntax and vocabulary.  Mood:  \"good\"  Affect:  appropriate and congruent with mood  Thought Process:  linear and coherent  Thought Content:   No SI/HI/AH/VH; No apparent delusions  Associations:  intact  Insight:  partial due to mood episode  Judgment:  fair due to recognition of mental illness  Impulse control: good  Attention Span:  grossly intact  Concentration:  grossly intact  Recent and Remote Memory:  not formally assessed  Fund of Knowledge: average  Muscle Strength and Tone: normal  Gait and Station: Normal     Psychiatric Assessment     Maria Del Carmen Sheikh is a 34 year old female with previous psychiatric diagnoses of AUD, Cannabis use disorder, [place], MDD admitted from the ER on 11/09/2024 due to concern for psychosis and roselia in the context of psychosocial stressors including stress from the recent elections and increasing work stress over the past 6 months. On admission, she presents with decreased need for sleep, racing thoughts, disorganization, delusions of paranoia thinking that she is being monitored by her work, poor insight and judgment.  Mental status is noted is notable for somewhat pressured speech with tangential and rambling thought process that was difficult to follow at times as well as odd facial expressions.  She described a pattern of decompensation precipitated by a doubling of her work load over the past 5 months in addition to a " recent promotion  3 weeks ago where she was staying up all night to try to catch up on missed emails and phone calls.  This week she was facing a potential reprimand at work because she was unable to fulfill her duties and upon the advice of her uncle, agreeing to get evaluated.   Additionally her picture is further complicated by comorbid moderate alcohol use disorder. Collateral reports noted that her behavior is not typical for the last month, and they believe she has had increasing alcohol use especially in the last week prior to hospitalization as she was found to be intoxicated in the morning by family. Increased cannabis use may also be contributing.  This is the patient's first psychiatric hospitalization.      Given her presentation there is concern for substance-induced mood changes and/or bipolar disorder, with current episode hypomania/roselia the most likely diagnosis at this time.  On evaluation there were no signs of overt psychosis, patient did not appear to be responding to internal stimuli, and patient discussed paranoia in the context of her job and not being able to fulfill her work duties as well as stress due to recent election results.  It is unclear if collateral reports of disorganization and paranoia are secondary to a budding primary psychotic disorder or in the context of hypo/roselia and further evaluation would be needed to discern.  At this time patient warrants inpatient psychiatric hospitalization to maintain her safety.      Psychiatric Plan by Diagnosis      Today's changes:  - Increase naltrexone to 50 mg today  - Increase risperidone to 1 mg today  - Added Triamcinolone 0.5 mg, hand eczema     # Bipolar I Disorder, manic episode with mixed features  - Lithium 600 mg at bedtime   - Risperidone 1.0 mg at bedtime     #Alcohol Use Disorder, moderate   - GLORIA < 0.01 on admission. No withdrawal symptoms noted. MSSA discontinued 11/11  - Continue thiamine, multivitamin, folate  supplementation  - Per CD recs: inpatient residential tx  - 50 mg today    - Pertinent Labs/Monitoring:   - QTc (11/11) 416 msec  - Treponema Ab nonreactive, ESR wnl, JAY negative, HIV nonreactive   - NMDA receptor Ab pending      Additional Plans:  - Patient will be treated in therapeutic milieu with appropriate individual and group therapies as described       Psychiatric Hospital Course:      Maria Del Carmen Sheikh was admitted to [facility] as a voluntary patient.    Legal  Signed in voluntarily (11/9)     Medications:  PCP had prescribed Lexapro 10 mg a few days before admission. HELD PTA Lexapro 10 mg given recent manic episode.   MSSA protocol was initiated on admission. But no PRNs required, MSSA scores have been 1-3. On 11/11, MSSA protocol discontinued.  11/11 - Started lithium 300 mg as mood stabilizer given manic symptoms prior to hospitalization and risperidone 0.5 mg at bedtime.   11/12 - Increased Lithium to 600 mg at bedtime  11/14: Increase naltrexone to 50 mg and risperidone to 1 mg     The risks, benefits, alternatives, and side effects were discussed and understood by the patient.      Medical Assessment and Plan     Medical diagnoses to be addressed this admission:    # Mildly elevated liver enzymes  - Suspect due to alcohol use  - Avoid Acetaminophen for 72 hours   - PCP follow up      # Mild abdominal pain  - attributed to menstrual cramps though bleeding has mostly resolved  - may be related to excessive alcohol use   - Continue to monitor      # Vitamin D deficiency   Vitamin D (11/9) 17 on admission   - Vitamin D3 capsule 125 mcg daily      Medical course: Patient was physically examined by the ED prior to being transferred to the unit and was found to be medically stable and appropriate for admission.      Consults:  none     Checklist     Legal Status:  Orders Placed This Encounter      Voluntary       Safety Assessment:   Behavioral Orders   Procedures    Cheeking Precautions (behavioral units)     Code 1 - Restrict to Unit    Routine Programming     As clinically indicated    Status 15     Every 15 minutes.        Risk Assessment:  Risk for harm is low.  Risk factors: substance use and family history  Protective factors: family and engaged in treatment      SIO: No    Dispo: TBD. Disposition pending clinical stabilization, medication optimization and development of an appropriate discharge plan.        Attestations     Med Student: Sony Bazan, MS4  Visiting Medical Student     Resident/Fellow Attestation   I, Erlinda Daley, was present with the medical/LYDIA student who participated in the service and in the documentation of the note.  I have verified the history and personally performed the physical exam and medical decision making.  I agree with the assessment and plan of care as documented in the note.      Erlinda Daley  PGY1  Date of Service (when I saw the patient): 11/14/2024

## 2024-11-14 NOTE — PLAN OF CARE
"  Problem: Psychotic Signs/Symptoms  Goal: Improved Behavioral Control (Psychotic Signs/Symptoms)  Outcome: Progressing     Problem: Anxiety  Goal: Anxiety Reduction or Resolution  Outcome: Progressing   Goal Outcome Evaluation:     Pt  was up and visible in the lounge watching TV interacting with select peers. Affect flat but brightened on assessment. Mood is calm. Pt denied pain or discomfort. Denied SI/SIB/A&V hallucinations. Endorsed anxiety 3 and depression 2. Stated \"I am anxious because my mom is coming to visit, I have not seen her for a long time\". Pt is eating and drinking adequately. Pt was cooperative,compliant with medication and contracted for safety. No behavioral issues noted this shift.  "
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
## 2024-11-14 NOTE — PLAN OF CARE
"  Problem: Anxiety  Goal: Anxiety Reduction or Resolution  Outcome: Progressing     Problem: Adult Behavioral Health Plan of Care  Goal: Optimized Coping Skills in Response to Life Stressors  Outcome: Progressing     Problem: Anxiety  Goal: Anxiety Reduction or Resolution  Outcome: Progressing     Problem: Psychotic Signs/Symptoms  Goal: Improved Behavioral Control (Psychotic Signs/Symptoms)  Outcome: Progressing   Goal Outcome Evaluation:    Pt was visible in the milieu. Mood is calm. Present with a bright affect. Stated, \"overall l  feel good, they have found an out pt tx for me\". Denied pain or discomfort. Endorsed anxiety 3, depression 2. Denies delusions and hallucinations. Hydration and nutrition adequate. Hygiene appropriate. Pt took a shower this evening. Family visited and it went well. No behavioral concerns. Pt contracted for safety.      "

## 2024-11-14 NOTE — PLAN OF CARE
11/14/24 0600   Sleep/Rest   Sleep/Rest/Relaxation no problem identified   Sleep Hygiene Promotion noise level reduced;regular sleep pattern promoted   Night Time # Hours 6.75 hours     Patient slept for the most part of the night, 6.75 hours.     Problem: Sleep Disturbance  Goal: Adequate Sleep/Rest  Outcome: Progressing   Goal Outcome Evaluation:

## 2024-11-14 NOTE — PLAN OF CARE
"  Problem: Adult Inpatient Plan of Care  Goal: Plan of Care Review  Description: The Plan of Care Review/Shift note should be completed every shift.  The Outcome Evaluation is a brief statement about your assessment that the patient is improving, declining, or no change.  This information will be displayed automatically on your shift  note.  Outcome: Progressing  Flowsheets (Taken 11/14/2024 1409)  Plan of Care Reviewed With: patient  Overall Patient Progress: improving  Goal: Patient-Specific Goal (Individualized)  Description: You can add care plan individualizations to a care plan. Examples of Individualization might be:  \"Parent requests to be called daily at 9am for status\", \"I have a hard time hearing out of my right ear\", or \"Do not touch me to wake me up as it startles  me\".  Outcome: Progressing  Goal: Absence of Hospital-Acquired Illness or Injury  Outcome: Progressing  Intervention: Prevent Skin Injury  Recent Flowsheet Documentation  Taken 11/14/2024 0800 by Christopher Jordan RN  Body Position: position changed independently  Goal: Optimal Comfort and Wellbeing  Outcome: Progressing  Intervention: Provide Person-Centered Care  Recent Flowsheet Documentation  Taken 11/14/2024 0800 by Christopher Jordan RN  Trust Relationship/Rapport:   care explained   choices provided   emotional support provided   empathic listening provided   questions answered   reassurance provided   thoughts/feelings acknowledged  Goal: Readiness for Transition of Care  Outcome: Progressing     Problem: Adult Behavioral Health Plan of Care  Goal: Plan of Care Review  Outcome: Progressing  Flowsheets (Taken 11/14/2024 1409)  Plan of Care Reviewed With: patient  Overall Patient Progress: improving  Goal: Patient-Specific Goal (Individualization)  Description: You can add care plan individualizations to a care plan. Examples of Individualization might be:  \"Parent requests to be called daily at 9am for status\", \"I have a hard time hearing out " "of my right ear\", or \"Do not touch me to wake me up as it startles  me\".  Outcome: Progressing  Goal: Adheres to Safety Considerations for Self and Others  Outcome: Progressing  Goal: Absence of New-Onset Illness or Injury  Outcome: Progressing  Goal: Optimized Coping Skills in Response to Life Stressors  Outcome: Progressing  Intervention: Promote Effective Coping Strategies  Recent Flowsheet Documentation  Taken 11/14/2024 1156 by Christopher Jordan RN  Supportive Measures:   self-care encouraged   journaling promoted  Goal: Develops/Participates in Therapeutic Beatty to Support Successful Transition  Outcome: Progressing  Intervention: Foster Therapeutic Beatty  Recent Flowsheet Documentation  Taken 11/14/2024 0800 by Christopher Jordan RN  Trust Relationship/Rapport:   care explained   choices provided   emotional support provided   empathic listening provided   questions answered   reassurance provided   thoughts/feelings acknowledged     Problem: Sleep Disturbance  Goal: Adequate Sleep/Rest  Outcome: Progressing  Intervention: Promote Sleep/Rest  Recent Flowsheet Documentation  Taken 11/14/2024 1156 by Christopher Jordan RN  Sleep/Rest Enhancement:   reading promoted   snack   relaxation techniques promoted  Taken 11/14/2024 0800 by Christopher Jordan RN  Sleep/Rest Enhancement:   reading promoted   family presence promoted   relaxation techniques promoted   snack   noise level reduced     Problem: Anxiety  Goal: Anxiety Reduction or Resolution  Outcome: Progressing  Intervention: Promote Anxiety Reduction  Recent Flowsheet Documentation  Taken 11/14/2024 1156 by Christopher Jordan RN  Supportive Measures:   self-care encouraged   journaling promoted  Family/Support System Care:   involvement promoted   presence promoted   self-care encouraged  Taken 11/14/2024 0800 by Christopher Jordan RN  Family/Support System Care: involvement promoted     Problem: Depression  Goal: Decreased Depression Symptoms  Outcome: " Progressing  Intervention: Monitor and Manage Depressive Symptoms  Recent Flowsheet Documentation  Taken 11/14/2024 1156 by Christopher Jordan RN  Supportive Measures:   self-care encouraged   journaling promoted  Family/Support System Care:   involvement promoted   presence promoted   self-care encouraged  Taken 11/14/2024 0800 by Christopher Jordan RN  Family/Support System Care: involvement promoted     Problem: Psychotic Signs/Symptoms  Goal: Improved Behavioral Control (Psychotic Signs/Symptoms)  Outcome: Progressing  Goal: Optimal Cognitive Function (Psychotic Signs/Symptoms)  Outcome: Progressing  Goal: Increased Participation and Engagement (Psychotic Signs/Symptoms)  Outcome: Progressing  Intervention: Facilitate Participation and Engagement  Recent Flowsheet Documentation  Taken 11/14/2024 1156 by Christopher Jordan RN  Supportive Measures:   self-care encouraged   journaling promoted  Goal: Improved Mood Symptoms (Psychotic Signs/Symptoms)  Outcome: Progressing  Intervention: Optimize Emotion and Mood  Recent Flowsheet Documentation  Taken 11/14/2024 1156 by Christopher Jordan RN  Supportive Measures:   self-care encouraged   journaling promoted  Goal: Improved Psychomotor Symptoms (Psychotic Signs/Symptoms)  Outcome: Progressing  Goal: Decreased Sensory Symptoms (Psychotic Signs/Symptoms)  Outcome: Progressing  Goal: Improved Sleep (Psychotic Signs/Symptoms)  Outcome: Progressing  Goal: Enhanced Social, Occupational or Functional Skills (Psychotic Signs/Symptoms)  Outcome: Progressing  Intervention: Promote Social, Occupational and Functional Ability  Recent Flowsheet Documentation  Taken 11/14/2024 0800 by Christopher Jrodan RN  Trust Relationship/Rapport:   care explained   choices provided   emotional support provided   empathic listening provided   questions answered   reassurance provided   thoughts/feelings acknowledged   Goal Outcome Evaluation:      Plan of Care Reviewed With: patient    Overall Patient Progress:  "improving  Patient has been visible in the milieu.Sociable with select peers.Compliant with medication.No prn given this shift.Patient attended OT groups.[facility] area watching TV.Paced halls with Hphone in place listening to music.Patient has acne on her face & rash on her right hand,cream ordered but not here yet.Denies pain/discomfort.Denies all MH issues.No escalation of behaviors.No safety concerns.  Adequate food and fluid intake.Hygiene is appropriate.  /85 (BP Location: Right arm, Patient Position: Sitting, Cuff Size: Adult Regular)   Pulse 65   Temp 97.8  F (36.6  C)   Resp 16   Ht 1.753 m (5' 9\")   Wt 85.8 kg (189 lb 1.6 oz)   LMP 11/06/2024   SpO2 100%   BMI 27.93 kg/m                 "

## 2024-11-15 ENCOUNTER — PATIENT OUTREACH (OUTPATIENT)
Dept: CARE COORDINATION | Facility: CLINIC | Age: 34
End: 2024-11-15
Payer: COMMERCIAL

## 2024-11-15 PROCEDURE — 99232 SBSQ HOSP IP/OBS MODERATE 35: CPT | Mod: GC | Performed by: STUDENT IN AN ORGANIZED HEALTH CARE EDUCATION/TRAINING PROGRAM

## 2024-11-15 PROCEDURE — 124N000002 HC R&B MH UMMC

## 2024-11-15 PROCEDURE — H2032 ACTIVITY THERAPY, PER 15 MIN: HCPCS

## 2024-11-15 PROCEDURE — 250N000013 HC RX MED GY IP 250 OP 250 PS 637

## 2024-11-15 PROCEDURE — 250N000013 HC RX MED GY IP 250 OP 250 PS 637: Performed by: PSYCHIATRY & NEUROLOGY

## 2024-11-15 RX ADMIN — TRIAMCINOLONE ACETONIDE: 5 OINTMENT TOPICAL at 09:23

## 2024-11-15 RX ADMIN — TRIAMCINOLONE ACETONIDE: 5 OINTMENT TOPICAL at 20:45

## 2024-11-15 RX ADMIN — RISPERIDONE 1 MG: 1 TABLET, FILM COATED ORAL at 20:47

## 2024-11-15 RX ADMIN — FOLIC ACID 1 MG: 1 TABLET ORAL at 08:21

## 2024-11-15 RX ADMIN — LITHIUM CARBONATE 600 MG: 300 TABLET, EXTENDED RELEASE ORAL at 20:47

## 2024-11-15 RX ADMIN — THIAMINE HCL TAB 100 MG 100 MG: 100 TAB at 08:21

## 2024-11-15 RX ADMIN — Medication 125 MCG: at 08:21

## 2024-11-15 RX ADMIN — Medication 1 TABLET: at 08:21

## 2024-11-15 RX ADMIN — NALTREXONE HYDROCHLORIDE 50 MG: 50 TABLET, FILM COATED ORAL at 08:27

## 2024-11-15 ASSESSMENT — ACTIVITIES OF DAILY LIVING (ADL)
DRESS: INDEPENDENT
ADLS_ACUITY_SCORE: 0
HYGIENE/GROOMING: HANDWASHING;INDEPENDENT
ADLS_ACUITY_SCORE: 0
DRESS: INDEPENDENT
HYGIENE/GROOMING: BATH;INDEPENDENT
ADLS_ACUITY_SCORE: 0
ORAL_HYGIENE: INDEPENDENT
ADLS_ACUITY_SCORE: 0
ORAL_HYGIENE: INDEPENDENT
ADLS_ACUITY_SCORE: 0
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
## 2024-11-15 NOTE — PLAN OF CARE
Assessment/Intervention/Current Symtoms and Care Coordination:  Chart reviewed and patient met with team,   Discussed patient progress, symptomology, and response to treatment.  Discussed the discharge plan and any potential impediments to discharge.    Patient continues to report that she is feeling  better,sleeping overall has improved since admitted. She has been compliant with meds.  Patient has attended groups and reports these have been helpful in gaining coping skills.    Writer has been coordinating possible admission to [facility]. Initially they stated there would be a significant copay however writer explained that her OOP deductible should be met from current hospitalization and thus program may be able to admit her without any charge.  Awaiting finalization on this.    Per MD, patient should be ready to discharge early next week. Lithium level will be drawn 11/17.    Patient's mother concerned that patient is behind on bills. Mom is unable to access bank acct  - was considering filing for power of  however writer suggested she come here to the unit and assist patient with paying her bills on line.     Discharge Plan or Goal:  Residential [facility] Treatment  Psychiatry F/U  Therapy     Barriers to Discharge:  Severity of symptoms  Need for med mgmt per MD's  Placement at Residential MI/CD Tx    Referral Status:  Referrals/ Alternatives:  [place] [facility] Access Team for Referrals  Phone: [phone]  Fax: [phone]  [email or web address]     [address] [address]  Phone: [phone]    Efax: [phone]    [email or web address]     [facility]  [address] [place] [address]  Phone: [phone]  Fax: [phone]     [address] [address]  Phone: [phone] Fax: [phone]  Fax: [phone]  [email or web address]     Legal Status:    County:  File Number:    Start and expiration date of commitment:     Contacts:  Primary Physician: Aaliyah Vo, ROMERO Sena PA-C as Assigned PCP  Jossy Adam PA-C as Physician Assistant (Family Medicine)  Lexii Mustafa, , CHW as Community Health Worker    Family Members: Odessa Godinez-[phone]; Cleve Sheikh,[phone]     Upcoming Meetings and Dates/Important Information and next steps:  Team Update: Monday

## 2024-11-15 NOTE — PROGRESS NOTES
"  ----------------------------------------------------------------------------------------------------------  [facility]  Psychiatry Progress Note  Hospital Day #6     Interim History:     The patient's care was discussed with the treatment team and chart notes were reviewed.    Vitals: VSS  Sleep: 7 hours (11/15/24 0600)  Scheduled medications: Took all scheduled medications as prescribed  Psychiatric PRN medications: No psychiatric prns given    Staff Report:   Pt appears to have slept for 7 hours. Pt did not complain of pain or discomfort, and no PRN medications were given. 15 minutes safety checks were in place. Staff will continue to offer support to pt.      Subjective:     Patient Interview:  Patient is seen in the room. She was sitting on the edge of the bed and was talking calmly. She stated that her mood is good today. Says she was anxious yesterday as the emergency condition happened on the unit and her Mom had to leave, but states that she knows she is safe here and she has learned a lot of great coping skills at the groups. States that she started the collage and has made some art pieces which she thinks is a good activity to keep herself occupied. Agrees that she has been making very good improvement. Denied any SI/HI/AVH and side effects of medications.     ROS:  Patient has no bothersome physical symptoms  Patient denies acute concerns     Objective:     Vitals:  BP (!) 141/91   Pulse 66   Temp 98.1  F (36.7  C)   Resp 16   Ht 1.753 m (5' 9\")   Wt 85.8 kg (189 lb 1.6 oz)   LMP 11/06/2024   SpO2 99%   BMI 27.93 kg/m      Allergies:  Allergies   Allergen Reactions    Ibuprofen Hives    Cefpodoxime Nausea and Vomiting    Metronidazole Nausea and Vomiting     Other reaction(s): Dizziness       Current Medications:  Scheduled:  Current Facility-Administered Medications   Medication Dose Route Frequency Provider Last Rate Last Admin    alum & mag " hydroxide-simethicone (MAALOX) suspension 30 mL  30 mL Oral Q4H PRN Dagher, Ramez, MD        cholecalciferol (VITAMIN D3) capsule 125 mcg  125 mcg Oral Daily Celine Hunt DO   125 mcg at 11/14/24 0847    folic acid (FOLVITE) tablet 1 mg  1 mg Oral Daily Dagher, Ramez, MD   1 mg at 11/14/24 0847    hydrOXYzine HCl (ATARAX) tablet 25 mg  25 mg Oral Q4H PRN Dagher, Ramez, MD   25 mg at 11/10/24 1615    lithium ER (LITHOBID) CR tablet 600 mg  600 mg Oral At Bedtime Monty Hayden MD   600 mg at 11/14/24 2126    melatonin tablet 3 mg  3 mg Oral At Bedtime PRN Dagher, Ramez, MD   3 mg at 11/09/24 2057    multivitamin w/minerals (THERA-VIT-M) tablet 1 tablet  1 tablet Oral Daily Dagher, Ramez, MD   1 tablet at 11/14/24 0848    naltrexone (DEPADE/REVIA) tablet 50 mg  50 mg Oral Daily Erlinda Daley MD        OLANZapine (zyPREXA) tablet 10 mg  10 mg Oral TID PRN Dagher, Ramez, MD        Or    OLANZapine (zyPREXA) injection 10 mg  10 mg Intramuscular TID PRN Dagher, Ramez, MD        polyethylene glycol (MIRALAX) Packet 17 g  17 g Oral Daily PRN Dagher, Ramez, MD   17 g at 11/09/24 0839    risperiDONE (risperDAL) tablet 1 mg  1 mg Oral At Bedtime Erlinda Daley MD   1 mg at 11/14/24 2126    thiamine (B-1) tablet 100 mg  100 mg Oral Daily Dagher, Ramez, MD   100 mg at 11/14/24 0847    triamcinolone (KENALOG) 0.5 % ointment   Topical BID Erlinda Daley MD   Given at 11/14/24 2129       PRN:  Current Facility-Administered Medications   Medication Dose Route Frequency Provider Last Rate Last Admin    alum & mag hydroxide-simethicone (MAALOX) suspension 30 mL  30 mL Oral Q4H PRN Dagher, Ramez, MD        cholecalciferol (VITAMIN D3) capsule 125 mcg  125 mcg Oral Daily Celine Hunt DO   125 mcg at 11/14/24 0847    folic acid (FOLVITE) tablet 1 mg  1 mg Oral Daily Dagher, Ramez, MD   1 mg at 11/14/24 0847    hydrOXYzine HCl (ATARAX) tablet 25 mg  25 mg Oral Q4H PRN Dagher, Ramez, MD   25 mg at 11/10/24 1615    lithium ER (LITHOBID) CR  "tablet 600 mg  600 mg Oral At Bedtime Monty Hayden MD   600 mg at 11/14/24 2126    melatonin tablet 3 mg  3 mg Oral At Bedtime PRN Dagher, Ramez, MD   3 mg at 11/09/24 2057    multivitamin w/minerals (THERA-VIT-M) tablet 1 tablet  1 tablet Oral Daily Dagher, Ramez, MD   1 tablet at 11/14/24 0848    naltrexone (DEPADE/REVIA) tablet 50 mg  50 mg Oral Daily Erlinda Daley MD        OLANZapine (zyPREXA) tablet 10 mg  10 mg Oral TID PRN Dagher, Ramez, MD        Or    OLANZapine (zyPREXA) injection 10 mg  10 mg Intramuscular TID PRN Dagher, Ramez, MD        polyethylene glycol (MIRALAX) Packet 17 g  17 g Oral Daily PRN Dagher, Ramez, MD   17 g at 11/09/24 0839    risperiDONE (risperDAL) tablet 1 mg  1 mg Oral At Bedtime Erlinda Daley MD   1 mg at 11/14/24 2126    thiamine (B-1) tablet 100 mg  100 mg Oral Daily Dagher, Ramez, MD   100 mg at 11/14/24 0847    triamcinolone (KENALOG) 0.5 % ointment   Topical BID Erlinda Daley MD   Given at 11/14/24 2129       Labs and Imaging:  New results:   No results found for this or any previous visit (from the past 24 hours).      Data this admission:  - CBC unremarkable  - CMP unremarkable  - TSH normal  - UDS negative  - Vit D level was low, 17  - Hgb A1c normal  - Lipids unremarkable  - Vit B12 normal  - Folate normal     Mental Status Exam:     Oriented to:  Grossly Oriented  General:  Awake and Alert  Appearance:  appears stated age  Behavior/Attitude:  Calm and Cooperative  Eye Contact: Appropriate  Psychomotor: Tics - eyes roll upward; no catatonia present  Speech:  appropriate volume/tone  Language: Fluent in English with appropriate syntax and vocabulary.  Mood:  \"good\"  Affect:  appropriate and congruent with mood  Thought Process:  linear and coherent  Thought Content:   No SI/HI/AH/VH; No apparent delusions  Associations:  intact  Insight:  partial due to mood episode  Judgment:  fair due to recognition of mental illness  Impulse control: good  Attention Span:  grossly " intact  Concentration:  grossly intact  Recent and Remote Memory:  not formally assessed  Fund of Knowledge: average  Muscle Strength and Tone: normal  Gait and Station: Normal     Psychiatric Assessment     Maria Del Carmen Sheikh is a 34 year old female with previous psychiatric diagnoses of AUD, Cannabis use disorder, [place], MDD admitted from the ER on 11/09/2024 due to concern for psychosis and roselia in the context of psychosocial stressors including stress from the recent elections and increasing work stress over the past 6 months. On admission, she presents with decreased need for sleep, racing thoughts, disorganization, delusions of paranoia thinking that she is being monitored by her work, poor insight and judgment.  Mental status is noted is notable for somewhat pressured speech with tangential and rambling thought process that was difficult to follow at times as well as odd facial expressions.  She described a pattern of decompensation precipitated by a doubling of her work load over the past 5 months in addition to a recent promotion  3 weeks ago where she was staying up all night to try to catch up on missed emails and phone calls.  This week she was facing a potential reprimand at work because she was unable to fulfill her duties and upon the advice of her uncle, agreeing to get evaluated.   Additionally her picture is further complicated by comorbid moderate alcohol use disorder. Collateral reports noted that her behavior is not typical for the last month, and they believe she has had increasing alcohol use especially in the last week prior to hospitalization as she was found to be intoxicated in the morning by family. Increased cannabis use may also be contributing.  This is the patient's first psychiatric hospitalization.      Given her presentation there is concern for substance-induced mood changes and/or bipolar disorder, with current episode hypomania/roselia the most likely diagnosis at this time.  On  evaluation there were no signs of overt psychosis, patient did not appear to be responding to internal stimuli, and patient discussed paranoia in the context of her job and not being able to fulfill her work duties as well as stress due to recent election results.  It is unclear if collateral reports of disorganization and paranoia are secondary to a budding primary psychotic disorder or in the context of hypo/roselia and further evaluation would be needed to discern.  At this time patient warrants inpatient psychiatric hospitalization to maintain her safety.      Psychiatric Plan by Diagnosis      Today's changes:  - No changes today  - working on finding the best CD Tx facilities after discharge     # Bipolar I Disorder, manic episode with mixed features  - Lithium 600 mg at bedtime   - Risperidone 1.0 mg at bedtime     #Alcohol Use Disorder, moderate   - GLORIA < 0.01 on admission. No withdrawal symptoms noted. MSSA discontinued 11/11  - Continue thiamine, multivitamin, folate supplementation  - Per CD recs: inpatient residential tx  - Naltrexone 50mg daily    - Pertinent Labs/Monitoring:   - QTc (11/11) 416 msec  - Treponema Ab nonreactive, ESR wnl, JAY negative, HIV nonreactive   - NMDA receptor Ab pending      Additional Plans:  - Patient will be treated in therapeutic milieu with appropriate individual and group therapies as described       Psychiatric Hospital Course:      Maria Del Carmen Sheikh was admitted to [facility] as a voluntary patient.    Legal  Signed in voluntarily (11/9)     Medications:  PCP had prescribed Lexapro 10 mg a few days before admission. HELD PTA Lexapro 10 mg given recent manic episode.   MSSA protocol was initiated on admission. But no PRNs required, MSSA scores have been 1-3. On 11/11, MSSA protocol discontinued.  11/11 - Started lithium 300 mg as mood stabilizer given manic symptoms prior to hospitalization and risperidone 0.5 mg at bedtime.   11/12 - Increased Lithium to 600 mg at  bedtime  11/14: Increase naltrexone to 50 mg and risperidone to 1 mg     The risks, benefits, alternatives, and side effects were discussed and understood by the patient.      Medical Assessment and Plan     Medical diagnoses to be addressed this admission:    # Mildly elevated liver enzymes  - Suspect due to alcohol use  - Avoid Acetaminophen for 72 hours   - PCP follow up      # Mild abdominal pain  - attributed to menstrual cramps though bleeding has mostly resolved  - may be related to excessive alcohol use   - Continue to monitor      # Vitamin D deficiency   Vitamin D (11/9) 17 on admission   - Vitamin D3 capsule 125 mcg daily      Medical course: Patient was physically examined by the ED prior to being transferred to the unit and was found to be medically stable and appropriate for admission.      Consults:  none     Checklist     Legal Status:  Orders Placed This Encounter      Voluntary       Safety Assessment:   Behavioral Orders   Procedures    Cheeking Precautions (behavioral units)    Code 1 - Restrict to Unit    Routine Programming     As clinically indicated    Status 15     Every 15 minutes.        Risk Assessment:  Risk for harm is low.  Risk factors: substance use and family history  Protective factors: family and engaged in treatment      SIO: No    Dispo: TBD. Disposition pending clinical stabilization, medication optimization and development of an appropriate discharge plan.     Erlinda Daley MD  PGY1 Psychiatry Resident

## 2024-11-15 NOTE — PLAN OF CARE
"  Problem: Adult Inpatient Plan of Care  Goal: Patient-Specific Goal (Individualized)  Description: You can add care plan individualizations to a care plan. Examples of Individualization might be:  \"Parent requests to be called daily at 9am for status\", \"I have a hard time hearing out of my right ear\", or \"Do not touch me to wake me up as it startles  me\".  Outcome: Progressing  Goal: Absence of Hospital-Acquired Illness or Injury  Outcome: Progressing     Problem: Adult Behavioral Health Plan of Care  Goal: Adheres to Safety Considerations for Self and Others  Outcome: Progressing  Intervention: Develop and Maintain Individualized Safety Plan  Recent Flowsheet Documentation  Taken 11/15/2024 1400 by Jannette Fairchild, RN  Safety Measures: environmental rounds completed  Goal: Absence of New-Onset Illness or Injury  Outcome: Progressing     Problem: Anxiety  Goal: Anxiety Reduction or Resolution  Outcome: Progressing  Intervention: Promote Anxiety Reduction  Recent Flowsheet Documentation  Taken 11/15/2024 1400 by Jannette Fairchild, RN  Family/Support System Care: involvement promoted     Problem: Depression  Goal: Decreased Depression Symptoms  Outcome: Progressing  Intervention: Monitor and Manage Depressive Symptoms  Recent Flowsheet Documentation  Taken 11/15/2024 1400 by Jannette Fairchild, RN  Family/Support System Care: involvement promoted   Goal Outcome Evaluation:    Plan of Care Reviewed With: patient    Pt presented with a bright affect. Endorsed mild anxiety and depression, denied SI and hallucinations. Pt is eating and drinking adequately. She is medication compliant. She took shower in the morning and applied the triamcinolone cream after shower. Pt attended groups this shift. She said that group is helping a lot and that she is learning how to cope better. No behavioral concerns this shift.     "

## 2024-11-15 NOTE — PROGRESS NOTES
Rehab Group    Start time: 1115  End time: 1445  Patient time total: 90 minutes    attended partial groups     #3 attended   Group Type: music   Group Topic Covered: coping skills, emotional regulation, mindfulness, and problem solving     Group Session Details:     1) relaxation  2) HUMS (Healthy-Unhealthy Music Assessment)  3)Music Preferences and History Interviews/Topic Discussion      Patient Response/Contribution:  positive affect, cooperative with task, organized, attentive, and actively engaged     Patient Detail:    Pt calmly identified healthy and unhealthy ways they use music in their personal life and discussed with the group.    Also, calm, engaged and cooperative in group music interviews re: formative music in their lives.    Appropriately social with peers, brightened with interventions, appeared comfortable sharing, showed logical thought processes.         Activity Therapy Per 15 min ()    Patient Active Problem List   Diagnosis    Exposure to bat without known bite    Eczema    Hand dermatitis    Abnormal cervical Papanicolaou smear, unspecified abnormal pap finding    Papanicolaou smear of cervix with low grade squamous intraepithelial lesion (LGSIL)    Psychosis, unspecified psychosis type (H)    Paranoia (H)    Delusional ideas (H)    Acute psychosis (H)

## 2024-11-15 NOTE — PLAN OF CARE
"  Group Attendance:  attended full group    Time session began: 2000  Time session ended: 2033  Patient's total time in group: 33    Total # Attendees   6   Group Type psychotherapeutic     Group Topic Covered relationships and healthy coping skills     Group Session Detail Group members participated in a discussion centered on loneliness and relationships. Coping methods for loneliness and challenging relationship dynamics were discussed.      Patient's response to the group topic/interactions:  cooperative with task, supportive of peers, socially appropriate, listened actively, expressed understanding of topic, attentive, and actively engaged     Patient Details: Maria Del Carmen shared her lived experience with loneliness after the loss of a parent. She made multiple comments about challenging relational dynamics following the loss of a family member related to support and checking in from friends. When asked what was challenging about advocating for her needs to be met she stated the possibility of reaching out and being abandoned by friends. She was supportive of peers. During this group there was a behavioral code in the milieu, Maria Del Carmen shared that headphones have been \"very helpful\" in managing anxiety related to a challenging milieu. As a group we discussed other coping strategies for the evening as there may be an increase in anxiety regarding unsafe behaviors in the milieu.          08852 - Group psychotherapy - 1 Session  Patient Active Problem List   Diagnosis    Exposure to bat without known bite    Eczema    Hand dermatitis    Abnormal cervical Papanicolaou smear, unspecified abnormal pap finding    Papanicolaou smear of cervix with low grade squamous intraepithelial lesion (LGSIL)    Psychosis, unspecified psychosis type (H)    Paranoia (H)    Delusional ideas (H)    Acute psychosis (H)       "

## 2024-11-15 NOTE — PLAN OF CARE
Problem: Sleep Disturbance  Goal: Adequate Sleep/Rest  Outcome: Progressing    Pt appears to have slept for 7 hours. Pt did not complain of pain or discomfort, and no PRN medications were given. 15 minutes safety checks were in place. Staff will continue to offer support to pt.

## 2024-11-16 VITALS
BODY MASS INDEX: 28.01 KG/M2 | TEMPERATURE: 97.3 F | HEIGHT: 69 IN | SYSTOLIC BLOOD PRESSURE: 137 MMHG | RESPIRATION RATE: 16 BRPM | OXYGEN SATURATION: 100 % | DIASTOLIC BLOOD PRESSURE: 85 MMHG | HEART RATE: 66 BPM | WEIGHT: 189.1 LBS

## 2024-11-16 PROCEDURE — 250N000013 HC RX MED GY IP 250 OP 250 PS 637

## 2024-11-16 PROCEDURE — 250N000013 HC RX MED GY IP 250 OP 250 PS 637: Performed by: PSYCHIATRY & NEUROLOGY

## 2024-11-16 PROCEDURE — 124N000002 HC R&B MH UMMC

## 2024-11-16 RX ADMIN — RISPERIDONE 1 MG: 1 TABLET, FILM COATED ORAL at 21:36

## 2024-11-16 RX ADMIN — Medication 125 MCG: at 08:54

## 2024-11-16 RX ADMIN — FOLIC ACID 1 MG: 1 TABLET ORAL at 08:54

## 2024-11-16 RX ADMIN — POLYETHYLENE GLYCOL 3350 17 G: 17 POWDER, FOR SOLUTION ORAL at 09:20

## 2024-11-16 RX ADMIN — HYDROXYZINE HYDROCHLORIDE 25 MG: 25 TABLET, FILM COATED ORAL at 13:39

## 2024-11-16 RX ADMIN — Medication 1 TABLET: at 08:54

## 2024-11-16 RX ADMIN — TRIAMCINOLONE ACETONIDE: 5 OINTMENT TOPICAL at 21:37

## 2024-11-16 RX ADMIN — TRIAMCINOLONE ACETONIDE: 5 OINTMENT TOPICAL at 09:16

## 2024-11-16 RX ADMIN — HYDROXYZINE HYDROCHLORIDE 25 MG: 25 TABLET, FILM COATED ORAL at 09:15

## 2024-11-16 RX ADMIN — NALTREXONE HYDROCHLORIDE 50 MG: 50 TABLET, FILM COATED ORAL at 09:15

## 2024-11-16 RX ADMIN — THIAMINE HCL TAB 100 MG 100 MG: 100 TAB at 08:54

## 2024-11-16 RX ADMIN — LITHIUM CARBONATE 600 MG: 300 TABLET, EXTENDED RELEASE ORAL at 21:36

## 2024-11-16 ASSESSMENT — ACTIVITIES OF DAILY LIVING (ADL)
ADLS_ACUITY_SCORE: 0
ORAL_HYGIENE: INDEPENDENT
ADLS_ACUITY_SCORE: 0
HYGIENE/GROOMING: INDEPENDENT
DRESS: INDEPENDENT
ADLS_ACUITY_SCORE: 0
ORAL_HYGIENE: INDEPENDENT
HYGIENE/GROOMING: HANDWASHING;INDEPENDENT
ADLS_ACUITY_SCORE: 0
DRESS: INDEPENDENT
ADLS_ACUITY_SCORE: 0

## 2024-11-16 NOTE — PLAN OF CARE
Problem: Sleep Disturbance  Goal: Adequate Sleep/Rest  Outcome: Progressing     Pt appears to have slept for 6.50 hours. Pt did not complain of pain or discomfort, and no PRN medications were given. 15 minutes safety checks were in place. Staff will continue to offer support to pt.

## 2024-11-16 NOTE — PLAN OF CARE
"  Problem: Adult Behavioral Health Plan of Care  Goal: Patient-Specific Goal (Individualization)  Description: You can add care plan individualizations to a care plan. Examples of Individualization might be:  \"Parent requests to be called daily at 9am for status\", \"I have a hard time hearing out of my right ear\", or \"Do not touch me to wake me up as it startles  me\".  Outcome: Progressing  Goal: Adheres to Safety Considerations for Self and Others  Outcome: Progressing  Intervention: Develop and Maintain Individualized Safety Plan  Recent Flowsheet Documentation  Taken 11/16/2024 1300 by Jannette Fairchild RN  Safety Measures:   environmental rounds completed   safety rounds completed  Goal: Optimized Coping Skills in Response to Life Stressors  Outcome: Progressing     Problem: Sleep Disturbance  Goal: Adequate Sleep/Rest  Outcome: Progressing     Problem: Anxiety  Goal: Anxiety Reduction or Resolution  Outcome: Progressing     Problem: Depression  Goal: Decreased Depression Symptoms  Outcome: Progressing   Goal Outcome Evaluation:    Plan of Care Reviewed With: patient      Pt is visible in the milieu. She comes out for meals and medications. She is pleasant and cooperative. Medication compliant. She is eating and drinking adequately. Pt sits by the [facility] area, watching TV or reading her book. Pt endorsed mild anxiety and depression, denies SI and hallucinations. Pt Had her dad visit this shift and visit went well. She requested PRN for her anxiety about this visit. PRN Miralax was also requested and given.     "

## 2024-11-16 NOTE — PLAN OF CARE
Problem: Adult Inpatient Plan of Care  Goal: Plan of Care Review  Description: The Plan of Care Review/Shift note should be completed every shift.  The Outcome Evaluation is a brief statement about your assessment that the patient is improving, declining, or no change.  This information will be displayed automatically on your shift  note.  Outcome: Progressing  Flowsheets (Taken 11/15/2024 1900)  Plan of Care Reviewed With: patient  Overall Patient Progress: improving     Problem: Depression  Goal: Decreased Depression Symptoms  Outcome: Progressing     Problem: Anxiety  Goal: Anxiety Reduction or Resolution  Outcome: Progressing   Goal Outcome Evaluation:    Plan of Care Reviewed With: patient Plan of Care Reviewed With: patient    Overall Patient Progress: improvingOverall Patient Progress: improving     Patient endorsed minimal anxiety due to milieu acuity. She denied any depression or suicidal thoughts. She stated that she is more focused on discharge planning possibility on Monday and feels ready for it. She was mostly in milieu watching tv, social and interactive with peers. Appeared pleasant, she was cooperative and medication compliant. ADLs, food and fluids intake WNL.

## 2024-11-16 NOTE — PLAN OF CARE
Problem: Adult Inpatient Plan of Care  Goal: Plan of Care Review  Description: The Plan of Care Review/Shift note should be completed every shift.  The Outcome Evaluation is a brief statement about your assessment that the patient is improving, declining, or no change.  This information will be displayed automatically on your shift  note.  Outcome: Progressing  Flowsheets (Taken 11/16/2024 1368)  Plan of Care Reviewed With: patient  Overall Patient Progress: improving     Problem: Anxiety  Goal: Anxiety Reduction or Resolution  Outcome: Progressing     Problem: Depression  Goal: Decreased Depression Symptoms  Outcome: Progressing   Goal Outcome Evaluation:    Plan of Care Reviewed With: patient Plan of Care Reviewed With: patient    Overall Patient Progress: improvingOverall Patient Progress: improving     Patient spent most of this shift in lounge watching tv. She was social and interactive with peers and staff members. Appeared pleasant, she was cooperative and medication compliant. Patient denied any pain or discomfort, ADL WNL. Adequate food and fluids intake. Endorsed minimal anxiety and depression attributed to inability to be home for holidays. Patient denied suicidal thoughts, she contracted for safety.

## 2024-11-17 LAB — LITHIUM SERPL-SCNC: 0.66 MMOL/L (ref 0.6–1.2)

## 2024-11-17 PROCEDURE — 36415 COLL VENOUS BLD VENIPUNCTURE: CPT

## 2024-11-17 PROCEDURE — 250N000013 HC RX MED GY IP 250 OP 250 PS 637

## 2024-11-17 PROCEDURE — 250N000013 HC RX MED GY IP 250 OP 250 PS 637: Performed by: PSYCHIATRY & NEUROLOGY

## 2024-11-17 PROCEDURE — 97150 GROUP THERAPEUTIC PROCEDURES: CPT | Mod: GO

## 2024-11-17 PROCEDURE — 124N000002 HC R&B MH UMMC

## 2024-11-17 PROCEDURE — 80178 ASSAY OF LITHIUM: CPT

## 2024-11-17 RX ADMIN — NALTREXONE HYDROCHLORIDE 50 MG: 50 TABLET, FILM COATED ORAL at 08:55

## 2024-11-17 RX ADMIN — THIAMINE HCL TAB 100 MG 100 MG: 100 TAB at 08:55

## 2024-11-17 RX ADMIN — LITHIUM CARBONATE 600 MG: 300 TABLET, EXTENDED RELEASE ORAL at 21:29

## 2024-11-17 RX ADMIN — FOLIC ACID 1 MG: 1 TABLET ORAL at 08:55

## 2024-11-17 RX ADMIN — TRIAMCINOLONE ACETONIDE: 5 OINTMENT TOPICAL at 21:29

## 2024-11-17 RX ADMIN — TRIAMCINOLONE ACETONIDE: 5 OINTMENT TOPICAL at 08:55

## 2024-11-17 RX ADMIN — RISPERIDONE 1 MG: 1 TABLET, FILM COATED ORAL at 21:29

## 2024-11-17 RX ADMIN — Medication 125 MCG: at 08:55

## 2024-11-17 RX ADMIN — Medication 1 TABLET: at 08:55

## 2024-11-17 ASSESSMENT — ACTIVITIES OF DAILY LIVING (ADL)
HYGIENE/GROOMING: BATH
ADLS_ACUITY_SCORE: 0
ADLS_ACUITY_SCORE: 0
DRESS: SCRUBS (BEHAVIORAL HEALTH);INDEPENDENT
ORAL_HYGIENE: INDEPENDENT
DRESS: INDEPENDENT
ADLS_ACUITY_SCORE: 0
ORAL_HYGIENE: INDEPENDENT
ADLS_ACUITY_SCORE: 0
HYGIENE/GROOMING: INDEPENDENT;HANDWASHING
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
## 2024-11-17 NOTE — PLAN OF CARE
"  Problem: Adult Inpatient Plan of Care  Goal: Patient-Specific Goal (Individualized)  Description: You can add care plan individualizations to a care plan. Examples of Individualization might be:  \"Parent requests to be called daily at 9am for status\", \"I have a hard time hearing out of my right ear\", or \"Do not touch me to wake me up as it startles  me\".  Outcome: Progressing  Goal: Absence of Hospital-Acquired Illness or Injury  Outcome: Progressing  Goal: Optimal Comfort and Wellbeing  Outcome: Progressing  Intervention: Provide Person-Centered Care  Recent Flowsheet Documentation  Taken 11/17/2024 1100 by Jannette Fairchild, RN  Trust Relationship/Rapport:   care explained   choices provided   emotional support provided   empathic listening provided   questions encouraged   reassurance provided   thoughts/feelings acknowledged     Problem: Adult Behavioral Health Plan of Care  Goal: Patient-Specific Goal (Individualization)  Description: You can add care plan individualizations to a care plan. Examples of Individualization might be:  \"Parent requests to be called daily at 9am for status\", \"I have a hard time hearing out of my right ear\", or \"Do not touch me to wake me up as it startles  me\".  Outcome: Progressing  Flowsheets (Taken 11/17/2024 1142)  Patient Personal Strengths:   coping skills   expressive of needs   family/social support  Goal: Adheres to Safety Considerations for Self and Others  Outcome: Progressing  Intervention: Develop and Maintain Individualized Safety Plan  Recent Flowsheet Documentation  Taken 11/17/2024 1100 by Jannette Fairchild, RN  Safety Measures:   environmental rounds completed   safety rounds completed  Goal: Absence of New-Onset Illness or Injury  Outcome: Progressing  Goal: Optimized Coping Skills in Response to Life Stressors  Outcome: Progressing     Problem: Sleep Disturbance  Goal: Adequate Sleep/Rest  Outcome: Progressing     Problem: Anxiety  Goal: Anxiety " "Reduction or Resolution  Outcome: Progressing     Problem: Depression  Goal: Decreased Depression Symptoms  Outcome: Progressing   Goal Outcome Evaluation:    Plan of Care Reviewed With: patient      Pt presented with a bright affect. She  is visible in the milieu. She is pleasant and cooperative, medication compliant. She is eating and drinking adequately. Pt interacts with peers by the dining area or at the lounge area. Interactions are appropriate. Pt took shower this shift. She was able to join and participate  in OT clinic group. She  denies anxiety, depression, SI and hallucinations . She thinks that her lithium level is okay, and she feel \"good\". She reports that she was able to enjoy and finish 1 book yesterday. Pt now starting a new book. Pt was also observed watching TV with peer.     "

## 2024-11-18 PROCEDURE — 250N000013 HC RX MED GY IP 250 OP 250 PS 637

## 2024-11-18 PROCEDURE — 124N000002 HC R&B MH UMMC

## 2024-11-18 PROCEDURE — 99232 SBSQ HOSP IP/OBS MODERATE 35: CPT | Mod: GC | Performed by: STUDENT IN AN ORGANIZED HEALTH CARE EDUCATION/TRAINING PROGRAM

## 2024-11-18 PROCEDURE — 97150 GROUP THERAPEUTIC PROCEDURES: CPT | Mod: GO

## 2024-11-18 RX ORDER — MULTIPLE VITAMINS W/ MINERALS TAB 9MG-400MCG
1 TAB ORAL DAILY
Qty: 30 TABLET | Refills: 0 | Status: SHIPPED | OUTPATIENT
Start: 2024-11-19 | End: 2024-12-19

## 2024-11-18 RX ORDER — RISPERIDONE 1 MG/1
1 TABLET ORAL AT BEDTIME
Qty: 30 TABLET | Refills: 0 | Status: SHIPPED | OUTPATIENT
Start: 2024-11-18 | End: 2024-12-18

## 2024-11-18 RX ORDER — LITHIUM CARBONATE 300 MG/1
600 TABLET, FILM COATED, EXTENDED RELEASE ORAL AT BEDTIME
Qty: 60 TABLET | Refills: 0 | Status: SHIPPED | OUTPATIENT
Start: 2024-11-18 | End: 2024-12-18

## 2024-11-18 RX ORDER — LITHIUM CARBONATE 150 MG/1
750 CAPSULE ORAL AT BEDTIME
Qty: 150 CAPSULE | Refills: 0 | Status: SHIPPED | OUTPATIENT
Start: 2024-11-18 | End: 2024-11-18

## 2024-11-18 RX ORDER — LITHIUM CARBONATE 150 MG/1
150 CAPSULE ORAL AT BEDTIME
Qty: 30 CAPSULE | Refills: 0 | Status: SHIPPED | OUTPATIENT
Start: 2024-11-18 | End: 2024-12-18

## 2024-11-18 RX ORDER — LANOLIN ALCOHOL/MO/W.PET/CERES
100 CREAM (GRAM) TOPICAL DAILY
Qty: 30 TABLET | Refills: 0 | Status: SHIPPED | OUTPATIENT
Start: 2024-11-19 | End: 2024-12-19

## 2024-11-18 RX ORDER — NALTREXONE HYDROCHLORIDE 50 MG/1
50 TABLET, FILM COATED ORAL DAILY
Qty: 30 TABLET | Refills: 0 | Status: SHIPPED | OUTPATIENT
Start: 2024-11-19 | End: 2024-12-19

## 2024-11-18 RX ORDER — FOLIC ACID 1 MG/1
1 TABLET ORAL DAILY
Qty: 30 TABLET | Refills: 0 | Status: SHIPPED | OUTPATIENT
Start: 2024-11-19 | End: 2024-12-19

## 2024-11-18 RX ADMIN — TRIAMCINOLONE ACETONIDE: 5 OINTMENT TOPICAL at 08:09

## 2024-11-18 RX ADMIN — THIAMINE HCL TAB 100 MG 100 MG: 100 TAB at 08:08

## 2024-11-18 RX ADMIN — Medication 1 TABLET: at 08:08

## 2024-11-18 RX ADMIN — TRIAMCINOLONE ACETONIDE: 5 OINTMENT TOPICAL at 20:51

## 2024-11-18 RX ADMIN — LITHIUM CARBONATE 750 MG: 450 TABLET, EXTENDED RELEASE ORAL at 21:04

## 2024-11-18 RX ADMIN — Medication 125 MCG: at 08:08

## 2024-11-18 RX ADMIN — FOLIC ACID 1 MG: 1 TABLET ORAL at 08:08

## 2024-11-18 RX ADMIN — NALTREXONE HYDROCHLORIDE 50 MG: 50 TABLET, FILM COATED ORAL at 08:08

## 2024-11-18 RX ADMIN — RISPERIDONE 1 MG: 1 TABLET, FILM COATED ORAL at 21:05

## 2024-11-18 ASSESSMENT — ACTIVITIES OF DAILY LIVING (ADL)
ADLS_ACUITY_SCORE: 0
ORAL_HYGIENE: INDEPENDENT
ADLS_ACUITY_SCORE: 0
DRESS: SCRUBS (BEHAVIORAL HEALTH)
ADLS_ACUITY_SCORE: 0
HYGIENE/GROOMING: INDEPENDENT
ADLS_ACUITY_SCORE: 0

## 2024-11-18 NOTE — PROGRESS NOTES
"Maria Del Carmen has been visible in the milieu.Presents with bright affect.Mood is calm.Acne on her face still visible,cream applied.Patient denies MH issues.Compliant with medication.No prn given this shift.No escalation of behaviors.No safety concerns.Adequate food and fluid intake.Hygiene appropriate.Patient will discharge tomorrow and she will go to treatment.  Vital signs:  Temp: 97.9  F (36.6  C) Temp src: Oral BP: 121/78 Pulse: 86   Resp: 16 SpO2: 99 % O2 Device: None (Room air)   Height: 175.3 cm (5' 9\") Weight: 87 kg (191 lb 14.4 oz)  Estimated body mass index is 28.34 kg/m  as calculated from the following:    Height as of this encounter: 1.753 m (5' 9\").    Weight as of this encounter: 87 kg (191 lb 14.4 oz).       "

## 2024-11-18 NOTE — PROGRESS NOTES
"  ----------------------------------------------------------------------------------------------------------  [facility]  Psychiatry Progress Note  Hospital Day #9     Interim History:     The patient's care was discussed with the treatment team and chart notes were reviewed.    Vitals: VSS  Sleep: 7 hours (11/18/24 0600)  Scheduled medications: Took all scheduled medications as prescribed  Psychiatric PRN medications: No psychiatric prns given    Staff Report:   11/17 Pt appears to have slept for 7 hours. Pt did not complain of pain or discomfort, and no PRN medications were given. 15 minutes safety checks were in place. Staff will continue to offer support to pt.   11/16  Patient spent most of this shift in lounge watching tv. She was social and interactive with peers and staff members. Appeared pleasant, she was cooperative and medication compliant. Patient denied any pain or discomfort, ADL WNL. Adequate food and fluids intake. Endorsed minimal anxiety and depression attributed to inability to be home for holidays. Patient denied suicidal thoughts, she contracted for safety.      Subjective:     Patient Interview:  Patient was seen in her room. She was sitting on her bed and reported a great mood. Stated that she had a great weekend and she did watch great movies and talked to her mom and brother. States that she is glad that her mom is back. Denies any anxiety and depression. States feeling safe and excited about the discharge this week. The plan to increase her Lithium to 750 mg was discussed with the patient and she was in agreement with the plan.       ROS:  Patient has no bothersome physical symptoms  Patient denies acute concerns     Objective:     Vitals:  /78   Pulse 86   Temp 97.9  F (36.6  C) (Oral)   Resp 16   Ht 1.753 m (5' 9\")   Wt 87 kg (191 lb 14.4 oz)   LMP 11/06/2024   SpO2 99%   BMI 28.34 kg/m      Allergies:  Allergies   Allergen " Reactions    Ibuprofen Hives    Cefpodoxime Nausea and Vomiting    Metronidazole Nausea and Vomiting     Other reaction(s): Dizziness       Current Medications:  Scheduled:  Current Facility-Administered Medications   Medication Dose Route Frequency Provider Last Rate Last Admin    alum & mag hydroxide-simethicone (MAALOX) suspension 30 mL  30 mL Oral Q4H PRN Dagher, Ramez, MD        cholecalciferol (VITAMIN D3) capsule 125 mcg  125 mcg Oral Daily Celine Hunt DO   125 mcg at 11/18/24 0808    folic acid (FOLVITE) tablet 1 mg  1 mg Oral Daily Dagher, Ramez, MD   1 mg at 11/18/24 0808    hydrOXYzine HCl (ATARAX) tablet 25 mg  25 mg Oral Q4H PRN Dagher, Ramez, MD   25 mg at 11/16/24 1339    lithium ER (LITHOBID) CR tablet 750 mg  750 mg Oral At Bedtime Erlinda Daley MD        melatonin tablet 3 mg  3 mg Oral At Bedtime PRN Dagher, Ramez, MD   3 mg at 11/09/24 2057    multivitamin w/minerals (THERA-VIT-M) tablet 1 tablet  1 tablet Oral Daily Dagher, Ramez, MD   1 tablet at 11/18/24 0808    naltrexone (DEPADE/REVIA) tablet 50 mg  50 mg Oral Daily Erlinda Daley MD   50 mg at 11/18/24 0808    OLANZapine (zyPREXA) tablet 10 mg  10 mg Oral TID PRN Dagher, Ramez, MD        Or    OLANZapine (zyPREXA) injection 10 mg  10 mg Intramuscular TID PRN Dagher, Ramez, MD        polyethylene glycol (MIRALAX) Packet 17 g  17 g Oral Daily PRN Dagher, Ramez, MD   17 g at 11/16/24 0920    risperiDONE (risperDAL) tablet 1 mg  1 mg Oral At Bedtime Erlinda Daley MD   1 mg at 11/17/24 2129    thiamine (B-1) tablet 100 mg  100 mg Oral Daily Dagher, Ramez, MD   100 mg at 11/18/24 0808    triamcinolone (KENALOG) 0.5 % ointment   Topical BID Erlinda Daley MD   Given at 11/18/24 0809       PRN:  Current Facility-Administered Medications   Medication Dose Route Frequency Provider Last Rate Last Admin    alum & mag hydroxide-simethicone (MAALOX) suspension 30 mL  30 mL Oral Q4H PRN Dagher, Ramez, MD        cholecalciferol (VITAMIN D3) capsule 125 mcg   "125 mcg Oral Daily Celine Hunt DO   125 mcg at 11/18/24 0808    folic acid (FOLVITE) tablet 1 mg  1 mg Oral Daily Dagher, Ramez, MD   1 mg at 11/18/24 0808    hydrOXYzine HCl (ATARAX) tablet 25 mg  25 mg Oral Q4H PRN Dagher, Ramez, MD   25 mg at 11/16/24 1339    lithium ER (LITHOBID) CR tablet 750 mg  750 mg Oral At Bedtime Erlinda Daley MD        melatonin tablet 3 mg  3 mg Oral At Bedtime PRN Dagher, Ramez, MD   3 mg at 11/09/24 2057    multivitamin w/minerals (THERA-VIT-M) tablet 1 tablet  1 tablet Oral Daily Dagher, Ramez, MD   1 tablet at 11/18/24 0808    naltrexone (DEPADE/REVIA) tablet 50 mg  50 mg Oral Daily Erlinda Daley MD   50 mg at 11/18/24 0808    OLANZapine (zyPREXA) tablet 10 mg  10 mg Oral TID PRN Dagher, Ramez, MD        Or    OLANZapine (zyPREXA) injection 10 mg  10 mg Intramuscular TID PRN Dagher, Ramez, MD        polyethylene glycol (MIRALAX) Packet 17 g  17 g Oral Daily PRN Dagher, Ramez, MD   17 g at 11/16/24 0920    risperiDONE (risperDAL) tablet 1 mg  1 mg Oral At Bedtime Erlinda Daley MD   1 mg at 11/17/24 2129    thiamine (B-1) tablet 100 mg  100 mg Oral Daily Dagher, Ramez, MD   100 mg at 11/18/24 0808    triamcinolone (KENALOG) 0.5 % ointment   Topical BID Erlinda Daley MD   Given at 11/18/24 0809       Labs and Imaging:  New results:   No results found for this or any previous visit (from the past 24 hours).      Data this admission:  - CBC unremarkable  - CMP unremarkable  - TSH normal  - UDS negative  - Vit D level was low, 17  - Hgb A1c normal  - Lipids unremarkable  - Vit B12 normal  - Folate normal     Mental Status Exam:     Oriented to:  Grossly Oriented  General:  Awake and Alert  Appearance:  appears stated age  Behavior/Attitude:  Calm and Cooperative  Eye Contact: Appropriate  Psychomotor: Tics - eyes roll upward; no catatonia present  Speech:  appropriate volume/tone  Language: Fluent in English with appropriate syntax and vocabulary.  Mood:  \"good\"  Affect:  appropriate and " congruent with mood  Thought Process:  linear and coherent  Thought Content:   No SI/HI/AH/VH; No apparent delusions  Associations:  intact  Insight:  partial due to mood episode  Judgment:  fair due to recognition of mental illness  Impulse control: good  Attention Span:  grossly intact  Concentration:  grossly intact  Recent and Remote Memory:  not formally assessed  Fund of Knowledge: average  Muscle Strength and Tone: normal  Gait and Station: Normal     Psychiatric Assessment     Maria Del Carmen Sheikh is a 34 year old female with previous psychiatric diagnoses of AUD, Cannabis use disorder, [place], MDD admitted from the ER on 11/09/2024 due to concern for psychosis and roselia in the context of psychosocial stressors including stress from the recent elections and increasing work stress over the past 6 months. On admission, she presents with decreased need for sleep, racing thoughts, disorganization, delusions of paranoia thinking that she is being monitored by her work, poor insight and judgment.  Mental status is noted is notable for somewhat pressured speech with tangential and rambling thought process that was difficult to follow at times as well as odd facial expressions.  She described a pattern of decompensation precipitated by a doubling of her work load over the past 5 months in addition to a recent promotion  3 weeks ago where she was staying up all night to try to catch up on missed emails and phone calls.  This week she was facing a potential reprimand at work because she was unable to fulfill her duties and upon the advice of her uncle, agreeing to get evaluated.   Additionally her picture is further complicated by comorbid moderate alcohol use disorder. Collateral reports noted that her behavior is not typical for the last month, and they believe she has had increasing alcohol use especially in the last week prior to hospitalization as she was found to be intoxicated in the morning by family. Increased  cannabis use may also be contributing.  This is the patient's first psychiatric hospitalization.      Given her presentation there is concern for substance-induced mood changes and/or bipolar disorder, with current episode hypomania/roselia the most likely diagnosis at this time.  On evaluation there were no signs of overt psychosis, patient did not appear to be responding to internal stimuli, and patient discussed paranoia in the context of her job and not being able to fulfill her work duties as well as stress due to recent election results.  It is unclear if collateral reports of disorganization and paranoia are secondary to a budding primary psychotic disorder or in the context of hypo/roselia and further evaluation would be needed to discern.  At this time patient warrants inpatient psychiatric hospitalization to maintain her safety.      Psychiatric Plan by Diagnosis      Today's changes:  - Lithium was increased to 750 mg      # Bipolar I Disorder, manic episode with mixed features  - Lithium 750 mg at bedtime   - Risperidone 1mg at bedtime    #Alcohol Use Disorder, moderate   - GLORIA < 0.01 on admission. No withdrawal symptoms noted. MSSA discontinued 11/11  - Continue thiamine, multivitamin, folate supplementation  - Per CD recs: inpatient residential tx  - Naltrexone 50mg daily    - Pertinent Labs/Monitoring:   - QTc (11/11) 416 msec  - Treponema Ab nonreactive, ESR wnl, JAY negative, HIV nonreactive   - NMDA receptor Ab pending      Additional Plans:  - Patient will be treated in therapeutic milieu with appropriate individual and group therapies as described       Psychiatric Hospital Course:      Maria Del Carmen Sheikh was admitted to [facility] as a voluntary patient.    Legal  Signed in voluntarily (11/9)     Medications:  PCP had prescribed Lexapro 10 mg a few days before admission. HELD PTA Lexapro 10 mg given recent manic episode.   MSSA protocol was initiated on admission. But no PRNs required, MSSA scores have  been 1-3. On 11/11, [facility] protocol discontinued.  11/11 - Started lithium 300 mg as mood stabilizer given manic symptoms prior to hospitalization and risperidone 0.5 mg at bedtime.   11/12 - Increased Lithium to 600 mg at bedtime  11/14: Increase naltrexone to 50 mg and risperidone to 1 mg  11/18 Increase Lithium to 750 mg   The risks, benefits, alternatives, and side effects were discussed and understood by the patient.      Medical Assessment and Plan     Medical diagnoses to be addressed this admission:    # Mildly elevated liver enzymes  - Suspect due to alcohol use  - Avoid Acetaminophen for 72 hours   - PCP follow up      # Mild abdominal pain  - attributed to menstrual cramps though bleeding has mostly resolved  - may be related to excessive alcohol use   - Continue to monitor      # Vitamin D deficiency   Vitamin D (11/9) 17 on admission   - Vitamin D3 capsule 125 mcg daily      Medical course: Patient was physically examined by the ED prior to being transferred to the unit and was found to be medically stable and appropriate for admission.      Consults:  none     Checklist     Legal Status:  Orders Placed This Encounter      Voluntary       Safety Assessment:   Behavioral Orders   Procedures    Cheeking Precautions (behavioral units)    Code 1 - Restrict to Unit    Routine Programming     As clinically indicated    Status 15     Every 15 minutes.        Risk Assessment:  Risk for harm is low.  Risk factors: substance use and family history  Protective factors: family and engaged in treatment      SIO: No    Dispo: TBD. Disposition pending clinical stabilization, medication optimization and development of an appropriate discharge plan.     Erlinda Daley MD  PGY1 Psychiatry Resident

## 2024-11-18 NOTE — PLAN OF CARE
Problem: Anxiety  Goal: Anxiety Reduction or Resolution  Outcome: Progressing     Problem: Depression  Goal: Decreased Depression Symptoms  Outcome: Progressing   Goal Outcome Evaluation:    Pt spent majority of the shift in her room reading a book. Affect flat but brightened on approach.. Mood is calm and cooperative. Pt denied any physical pain or discomfort. Endorsed anxiety 1. Denies depression, SI/SIB. Denies hallucination and delusions. Judgement not appropriate to situation. Food and fluid intake adequate. Pt was compliant with medication contracted for safety. No behavioral issues this shift.
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
## 2024-11-18 NOTE — PROVIDER NOTIFICATION
11/18/24 1001   Individualization/Patient Specific Goals   Patient Personal Strengths coping skills;expressive of needs;family/social support   Patient Vulnerabilities lacks insight into illness;substance abuse/addiction   Anxieties, Fears or Concerns nONE   Individualized Care Needs nONE   Interprofessional Rounds   Summary Admitted due to concern for psychosis in the context of psychosocial stressors including work and stress from the recent elections. Pt is hospitalized voluntarily   Participants CTC;psychiatrist;nursing;other (see comments)   Behavioral Team Discussion   Participants Dr. Bolden, Dr. Daley, Christopher KRAMER RN; Radha Frazier MA.LP; medical students   Progress Patient has been showing improvement in manic sx's.  Meds have been adjusted per MD's. Patient has been compliant with meds, attending gps.  Patient completed DERICK assessment   Anticipated length of stay 3-5days   Continued Stay Criteria/Rationale Symptom stabilization, medication management, care coordination   Medical/Physical See H&P   Precautions See below   Plan Ongoing psychiatric assessment/Medication management per MD's. Awaiting confirmation on acceptance to [facility], and if not, CTC will follow up on alternate referrals. Referrals made for both Psychiatry/therapy.   Rationale for change in precautions or plan No change in plan/precautions   Anticipated Discharge Disposition substance use treatment     Goal Outcome Evaluation:

## 2024-11-18 NOTE — PROGRESS NOTES
Discussed patient with CC PUNEET   Patient admitted x 10 days. Will complete order and patient can be referred back after discharge if needed.     SERGEY Tejeda [place], Kameron Murillo [place] and [facility]  [phone]

## 2024-11-18 NOTE — PLAN OF CARE
Care Coordinator Note(s):    Care Request(s):   Psychiatry   Preferences: Time Frame: 3 Weeks, AM, In Person  Notes: [facility]    Therapy  Preferences: Time Frame: 3 Weeks, AM, In Person, Female Provider  Notes: [facility]        Care Outcome(s):    CC Progress Note(s)/ Documentation:     Booked via [facility].    Appointment: Therapy  Date/time: Monday December 9th, 2024 @ 2:00 pm In person  Provider:  Tiera Darling PsyD   Address: [facility], [address] [address]  Phone: [phone]  Fax:  [phone]  Note:   You should be prepared to provide your insurance card and identification card at your first appointment. I will send you the intake forms/paperwork via my electronic health record ([facility]) before the appointment for you to review and sign. We can also do it in the office if needed at the start of the appointment. Please feel free to reach out with any questions.    Appointment: Psychiatry   Date/time: Wednesday December 11th, 2024 @ 10:15 am In person  Provider:  Deo Irene MS, CNPRN  Address:[facility], [place], [address], [address], [place] [address]  Phone: [phone]  Fax: [phone]  Note:   Once scheduled, you will receive an email from [email or web address] with information on what paperwork needs to be completed prior to the appointment along with directions to the clinic! If you have any questions regarding your appointment or the paperwork, please call [phone] for assistance. Please visit [email or web address] for more information about our clinic.    -Kristie Chowdhury  Adult Behavioral Health Care Coordinator

## 2024-11-18 NOTE — PLAN OF CARE
Problem: Adult Inpatient Plan of Care  Goal: Plan of Care Review  Description: The Plan of Care Review/Shift note should be completed every shift.  The Outcome Evaluation is a brief statement about your assessment that the patient is improving, declining, or no change.  This information will be displayed automatically on your shift  note.  Outcome: Progressing  Flowsheets (Taken 11/17/2024 2146)  Plan of Care Reviewed With: patient  Overall Patient Progress: improving     Problem: Anxiety  Goal: Anxiety Reduction or Resolution  Outcome: Progressing     Problem: Depression  Goal: Decreased Depression Symptoms  Outcome: Progressing   Goal Outcome Evaluation:    Plan of Care Reviewed With: patient Plan of Care Reviewed With: patient    Overall Patient Progress: improvingOverall Patient Progress: improving       Patient spent most of this shift in lounge watching tv. She was social and interactive with peers and staff member. Appeared pleasant, she was cooperative and medication compliant. Patient denied any pain or discomfort, ADL WNL. Adequate food and fluids intake. Denied anxiety or depression. Denied suicidal thoughts, she contracted for safety.
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
## 2024-11-18 NOTE — DISCHARGE INSTRUCTIONS
Behavioral Discharge Planning and Instructions    Summary:   You were admitted to [facility] on 11/9/24 with worsening mood instability and alcohol use  under the care of  . You met with the Psychiatry team daily for ongoing psychiatric assessment and medication management.  You had opportunities to participate in therapeutic groups on the unit.   At this time you report your mood is stabilizing and you report you are not having thoughts or intent to harm yourself.   You expressed interest in CD treatment and completed a DERICK assessment.  You have been authorized placement at [facility].  .You will be discharged to [facility] and have been referred for both Psychiatry and therapy follow up appts.    Disposition:  [facility]    Main Diagnosis:   Bipolar Affective Disorder  Alcohol Use Disorder    Health Care Follow-up:   Appointment: Therapy  Date/time: Monday December 9th, 2024 @ 2:00 pm In person  Provider:  Tiera Darling PsyD, LP  Address: [facility], [address], [facility] [address], [place] [address]  Phone: [phone]  Fax:  [phone]  Note:   You should be prepared to provide your insurance card and identification card at your first appointment. I will send you the intake forms/paperwork via my electronic health record ([employer]) before the appointment for you to review and sign. We can also do it in the office if needed at the start of the appointment. Please feel free to reach out with any questions.    Appointment: Psychiatry   Date/time: Wednesday December 11th, 2024 @ 10:15 am In person  Provider:  eDo Irene MS, CNPRN  Address:[facility], [address], [address], [place] [id number]  Phone: [phone]  Fax: [phone]  Note:   Once scheduled, you will receive an email from [email or web address] with information on what paperwork needs to be completed prior to the appointment along  with directions to the clinic! If you have any questions regarding your appointment or the paperwork, please call [phone]. [phone] for assistance. Please visit [email or web address] for more information about our clinic.   ** [facility] to fax AVS upon discharge, please.       Major Treatments, Procedures and Findings:   Medications were  managed throughout your stay. An internal medicine consult was completed during your stay. You had the opportunity to participate in treatment programming while on the unit including occupational therapy, mental health support and education and spiritual services.     Symptoms to Report:   Please report if you are experiencing increased aggression and/or confusion, problematic loss of sleep, worsening mood, or thoughts of suicide to your treatment team or notify your primary provider.   IF THE SYMPTOMS YOU ARE EXPERIENCING ARE A MEDICAL EMERGENCY, CALL 911 IMMEDIATELY    Lifestyle Adjustment:   1. Adjust your lifestyle to get enough sleep, relaxation, exercise and good nutrition.  Continue to develop healthy coping skills to decrease stress and promote a healthy and sober lifestyle.  2. Abstain from all substances of abuse.  3. Take medications as prescribed.  Please work with your doctor to discuss any concerns you have with your medications or side effects you may be experiencing.  4. Follow up with appointments as scheduled.      Resources:   *[facility] Crisis: COPE: (684.269.3118) 24 hour mobile crisis support for people having a mental health crisis in [facility].   *Acute Psychiatric Services (576-280-5197). 24-hour walk-in crisis psychiatric support at [facility]; Emergency Medications Clinic available 7:30am - 2:00pm   ADDITIONAL SUPPORTS:  Call or text 508 - National Suicide & Crisis Lifeline - if you feel like you may be in crisis or having thoughts of  suicide.    National [place] ([place]) provides support  groups and educational programs.  Visit www.namimn.org Helpline at 1-192.143.8887 or 233-091-1581 for further information.  [facility] [place] (National [place] on Mental Illness) improves the lives of children and adults with mental  illnesses and their families by providing free classes on mental illnesses andsupport groups for adults with mental  illnesses, parents and family members.  For more information:  Phone: 954.335.7928  Toll free: 3-157-UZHU-Babble  Website: www.[employer]    The [place] [facility] provides a lnrc-wj-vlyn approach to mental health recovery, support and wellness. Calls are  answered by our team of professionally trained Certified Peer Specialists, who have first hand experience living with a  mental health condition.  Open Monday-Saturday, 5pm to 10pm. Call [phone].    You may contact the [facility] for brief, short-term solution focused therapy support with  your mental health goals. Call [phone] for more information or to schedule.     General Medication Instructions:   See your medication sheet(s) for instructions.   Take all medicines as directed.  Make no changes unless your doctor suggests them.   Go to all your doctor visits.  Be sure to have all your required lab tests. This way, your medicines can be refilled on time.  Do not use any drugs not prescribed by your doctor.    Advance Directives:   Scanned document on file with [place]? No scanned doc  Is document scanned? Pt states no documents  Honoring Choices Your Rights Handout: Informed and given  Was more information offered? Materials given    The Treatment team has appreciated the opportunity to work with you. If you have any questions or concerns about your recent admission, you can contact the unit which can receive your call 24 hours a day, 7 days a week. They will be able to get in touch with a Provider if needed. The unit number is [phone]

## 2024-11-18 NOTE — PLAN OF CARE
Brief Note:  Writer received notification that patient has been accepted to  [facility] and can admit tomorrow. MD/RN notified.  30 day supply of meds will be ordered by MD.  Brother Scar will pick patient up  - waiting to hear from brother with time.

## 2024-11-19 VITALS
TEMPERATURE: 98.9 F | DIASTOLIC BLOOD PRESSURE: 74 MMHG | SYSTOLIC BLOOD PRESSURE: 109 MMHG | OXYGEN SATURATION: 97 % | RESPIRATION RATE: 16 BRPM | BODY MASS INDEX: 28.61 KG/M2 | HEART RATE: 64 BPM | HEIGHT: 69 IN | WEIGHT: 193.2 LBS

## 2024-11-19 PROCEDURE — 250N000013 HC RX MED GY IP 250 OP 250 PS 637

## 2024-11-19 PROCEDURE — 99238 HOSP IP/OBS DSCHRG MGMT 30/<: CPT | Performed by: PSYCHIATRY & NEUROLOGY

## 2024-11-19 RX ADMIN — Medication 125 MCG: at 08:25

## 2024-11-19 RX ADMIN — FOLIC ACID 1 MG: 1 TABLET ORAL at 08:25

## 2024-11-19 RX ADMIN — TRIAMCINOLONE ACETONIDE: 5 OINTMENT TOPICAL at 08:25

## 2024-11-19 RX ADMIN — Medication 1 TABLET: at 08:25

## 2024-11-19 RX ADMIN — THIAMINE HCL TAB 100 MG 100 MG: 100 TAB at 08:25

## 2024-11-19 RX ADMIN — NALTREXONE HYDROCHLORIDE 50 MG: 50 TABLET, FILM COATED ORAL at 08:25

## 2024-11-19 ASSESSMENT — ACTIVITIES OF DAILY LIVING (ADL)
ADLS_ACUITY_SCORE: 0
ORAL_HYGIENE: INDEPENDENT
HYGIENE/GROOMING: INDEPENDENT
ADLS_ACUITY_SCORE: 0
DRESS: INDEPENDENT;STREET CLOTHES
ADLS_ACUITY_SCORE: 0

## 2024-11-19 NOTE — PLAN OF CARE
Assessment/Intervention/Current Symtoms and Care Coordination:  Chart reviewed and patient met with team,   Discussed patient progress, symptomology, and response to treatment.  Discussed the discharge plan and any potential impediments to discharge.    Patient accepted to [facility] and transferring there today.  Patient's brother Scar will pick her up ~ 12:30 pm and transport her.  30 day supply of meds on unit.    Patient has been set up with outpatient Psychiatry and therapy.     Discharge Plan or Goal:  Residential DERICK Treatment  Psychiatry F/U  Therapy     Barriers to Discharge:  None- discharge today    Referral Status:  Referrals/ Alternatives:  [facility] Team for Referrals  Phone: [phone]  Fax: [phone]  [email or web address]     [address] [address]  Phone: [phone]    Efax: [phone]    [email or web address]     [place]  [address] [place] [address]  Phone: [phone]  Fax: [phone]     [address] [address]  Phone: [phone] Fax: [phone]  Fax: [phone]  [email or web address]     Legal Status:  Voluntary    Contacts:  Primary Physician: Aaliyah Vo, ROMERO Sena PA-C as Assigned PCP  Jossy Adam PA-C as Physician Assistant (Family Medicine)  Lexii Mustafa, , CHW as Community Health Worker    Family Members: Odessa Godinez-[phone]; Cleve Sheikh,[phone]     Upcoming Meetings and Dates/Important Information and next steps:  Team Update: Monday

## 2024-11-19 NOTE — PLAN OF CARE
"Pt discharged as per order.  Pt denies SI.  Reviewed discharge paperwork with pt.  Pt pleasant and cooperative.  Pt discharged to CD tx.  Pts brother came and picked pt up.  Pt discharged with all belongings, discharge paperwork, and ordered discharge medications.      Problem: Adult Inpatient Plan of Care  Goal: Plan of Care Review  Description: The Plan of Care Review/Shift note should be completed every shift.  The Outcome Evaluation is a brief statement about your assessment that the patient is improving, declining, or no change.  This information will be displayed automatically on your shift  note.  Outcome: Adequate for Care Transition  Goal: Patient-Specific Goal (Individualized)  Description: You can add care plan individualizations to a care plan. Examples of Individualization might be:  \"Parent requests to be called daily at 9am for status\", \"I have a hard time hearing out of my right ear\", or \"Do not touch me to wake me up as it startles  me\".  Outcome: Adequate for Care Transition  Goal: Absence of Hospital-Acquired Illness or Injury  Outcome: Adequate for Care Transition  Goal: Optimal Comfort and Wellbeing  Outcome: Adequate for Care Transition  Goal: Readiness for Transition of Care  Outcome: Adequate for Care Transition     Problem: Adult Behavioral Health Plan of Care  Goal: Plan of Care Review  Outcome: Adequate for Care Transition  Goal: Patient-Specific Goal (Individualization)  Description: You can add care plan individualizations to a care plan. Examples of Individualization might be:  \"Parent requests to be called daily at 9am for status\", \"I have a hard time hearing out of my right ear\", or \"Do not touch me to wake me up as it startles  me\".  Outcome: Adequate for Care Transition  Goal: Adheres to Safety Considerations for Self and Others  Outcome: Adequate for Care Transition  Intervention: Develop and Maintain Individualized Safety Plan  Recent Flowsheet Documentation  Taken 11/19/2024 1000 " by Nell Guzman, RN  Safety Measures: safety rounds completed  Goal: Absence of New-Onset Illness or Injury  Outcome: Adequate for Care Transition  Goal: Optimized Coping Skills in Response to Life Stressors  Outcome: Adequate for Care Transition  Goal: Develops/Participates in Therapeutic Harmon to Support Successful Transition  Outcome: Adequate for Care Transition     Problem: Sleep Disturbance  Goal: Adequate Sleep/Rest  Outcome: Adequate for Care Transition     Problem: Anxiety  Goal: Anxiety Reduction or Resolution  Outcome: Adequate for Care Transition     Problem: Depression  Goal: Decreased Depression Symptoms  Outcome: Adequate for Care Transition     Problem: Psychotic Signs/Symptoms  Goal: Improved Behavioral Control (Psychotic Signs/Symptoms)  Outcome: Adequate for Care Transition  Goal: Optimal Cognitive Function (Psychotic Signs/Symptoms)  Outcome: Adequate for Care Transition  Goal: Increased Participation and Engagement (Psychotic Signs/Symptoms)  Outcome: Adequate for Care Transition  Goal: Improved Mood Symptoms (Psychotic Signs/Symptoms)  Outcome: Adequate for Care Transition  Goal: Improved Psychomotor Symptoms (Psychotic Signs/Symptoms)  Outcome: Adequate for Care Transition  Intervention: Manage Psychomotor Movement  Recent Flowsheet Documentation  Taken 11/19/2024 1000 by Nell Guzman, RN  Activity (Behavioral Health): up ad felisha  Goal: Decreased Sensory Symptoms (Psychotic Signs/Symptoms)  Outcome: Adequate for Care Transition  Goal: Improved Sleep (Psychotic Signs/Symptoms)  Outcome: Adequate for Care Transition  Goal: Enhanced Social, Occupational or Functional Skills (Psychotic Signs/Symptoms)  Outcome: Adequate for Care Transition   Goal Outcome Evaluation:

## 2024-11-19 NOTE — DISCHARGE SUMMARY
----------------------------------------------------------------------------------------------------------  [facility]   Psychiatric Discharge Summary      Maria Del Carmen Sheikh MRN# [id number]   Age: 34 year old YOB: 1990     Date of Admission:  11/8/2024  Date of Discharge:  11/19/2024  Admitting Physician:  Monty Hayden MD  Discharge Physician:  Monty Hayden MD    This document serves as a transfer of care to Maria Del Carmen Sheikh's outpatient providers.     Events Leading to Hospitalization:     Maria Del Carmen Sheikh is a 34 year old female with previous psychiatric diagnoses of AUD, Cannabis use disorder, BROOKLYN, MDD and ADHD  admitted from the ER on 11/09/2024 due to concern for psychosis in the context of psychosocial stressors including work and stress from the recent elections .     ED Assessment:  Maria Del Carmen Sheikh is a 34 year old female who presents to the ER for a psychiatric evaluation.  Patient is brought in by her uncle who provides collateral information.  According the patient she has been having increased paranoia concerns about people carefully tracking the work she has been doing at work including high-profile people from another county that she will not provide further information on, and also stating that President kala aPrker is personally watching her and her work.  She states that she lost her job.  Patient's uncle states that this has been ongoing for longer than just a day and she has now had 3 visits to an emergency department in the last week for psychiatric concerns.  She was started on Lexapro and hydroxyzine which the patient states she started today, but has not noticed any improvement.  The uncle is concerned that the patient is not oriented with reality and has concerns that are grandiose and delusional with paranoia.  She does have a  history of this about 6 months ago and was prescribed medications that the she did not start taking.  Patient denies any suicidal ideation, homicidal ideation.  She denies any hallucinations.  She denies any medical complaints of fevers, chills, chest pain, shortness of breath, abdominal pain, nausea, vomiting, or other concerns.      [facility]/DEC Assessment:  Pt brought to the ED by uncle for mental health evaluation. She is calm and cooperative. Speech is pressured.  She talks at lenght about work stress, promotion a month ago and excessive work and caseload. She has been struggling to manage it all. She is not sleeping more than 3-4 hours per night. She also talks about the stress of the recent presidential election and her dissatisfaction with the results.  Her thoughts appear to be disorganized and paranoid in content. She has begun to think she is being watched, followed and monitored. She thinks possibly it is the guardians of clients, possibly DHS or the government. She hs been scared to be in her own home due to her paranoia. She has not been to work since the election. Her insight and judgement are impaired.  She denies suicidal or homicial thoughts, plans, actions or intentions.  She is using alcohol daily, 3-4 glasses of wine and marijuana.     ED/Hospital Course:  Maria Del Carmen Sheikh was medically cleared for admission to inpatient psychiatric unit. In the ED, she was not given any psychiatric PRNs and was cooperative with cares.      Patient interview:  Patient was seen in her room on admission.  She is pleasant and cooperative, however thought process is noted to be tangential at times. Initially, the patient reports that she has a history of severe anxiety and depression for several years and also references significant stressor of  father dying in November 2013 and her relationship with her boyfriend ending in 2014.  However, as the interview proceeded, the patient reveal more information about increased  "work stressors over the summer due to \"coverage\" as well as increased work stress/falling behind on her emails and meetings particularly in the last month.      She reports she has has had significant amount of increased work load that has made it very difficult for her to keep up with her work obligations.  She said she originally had 30 cases but since May it has increased to 80 cases and she found herself unable to keep with answering emails and phone calls. She felt like she was able to do a decent job of keeping up until 3 weeks ago where she was promoted to . She reports that she has only been able to get about 1 to 3 hours of sleep due to her anxiety as well as her staying up all night to try to catch up on her work duties.  She reports that she is usually on top of everything and had a full sense of confidence in her ability to handle all these additional work commitments; however, she  found herself overwhelmed and  decided to go no contact with her boss this past week.  This is unlike her and instead she felt like she needed to go in to take care of her mental health.  She reports over the last week she has been missing emails phone calls to the point where she ended up having guardians of her her social work cases show up in person leading to an escalation in her job.  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  She reports her boss had sent her an email saying that she needed to report to her boss's office promptly at Monday which stressed her significantly and upon the advice of her uncle, she agreed to come to the hospital instead.  She reports she has also increased anxiety because she is not sure if she still has her job given the way that she stopped contact with her boss abruptly.  She did report that she was able to at least contact HR before  being admitted.   The patient also reports being more stressed " "by the presidential elections last week, and smoking more cannabis during the last week than usual.      She also reports in May of this year she went to [facility] to get an evaluation to rule out ADHD but did not complete neuropsych testing.  She reports at that time she did get diagnosed with anxiety and depression.  She reports she was given referrals for therapy but also noted that her insurance did not cover the initial visit nor her medications.  She found it too overwhelming to try to coordinate care and did not follow up with taking the prescribed medication or any follow up recommendations. She tended to give additional details throughout the interview that at times were inappropriate to the question being asked and reported various things such as some difficulties with a close friend who made a comment that she was \"delusional\" about her relationship with her boyfriend of 10 years ago and found that to be very hurtful, having sexual tension with a friend who also happened to date her friend 10 years ago, relationship difficulties with her boyfriend of 10 years ago who she is still friends  with, getting into a argument with a friend over difference sin Voodoo views, and how  in fall 2020 she had an increase desire to maintain cleanliness, repeatedly wash her hands, was hypervigilant about germs, was concerned about getting sick if she did not wash her hands but reports this is no longer an issue.      She endorses over the past couple of months decreased need for sleep, increased confidence, racing thoughts, increased energy, praying thoughts, feeling more connected to her Voodoo, increased paranoia about losing her job, Has become more irritable, increased sexual desire of a friend but has not acted on it.   She denies SI, HI, SIB, AVH, panic attacks, eatnd disorder, PTSD sx and repeated episodes of depression throughout her life.   No collateral was obtained, no answer.     See H&P by Donta Blake MD " on 11/9 for additional details.      Diagnoses:   Primary Psychiatric Diagnosis  # Bipolar I Disorder, manic episode with mixed features    Secondary Psychiatric Diagnoses  #Alcohol Use Disorder, moderate     Psychiatric Assessment:   Maria Del Carmen Sheikh is a 34 year old female with previous psychiatric diagnoses of AUD, Cannabis use disorder, BROOKLYN, MDD admitted from the ER on 11/09/2024 due to concern for psychosis and roselia in the context of psychosocial stressors including stress from the recent elections and increasing work stress over the past 6 months. On admission, she presents with decreased need for sleep, racing thoughts, disorganization, delusions of paranoia thinking that she is being monitored by her work, poor insight and judgment.  Mental status is noted is notable for somewhat pressured speech with tangential and rambling thought process that was difficult to follow at times as well as odd facial expressions.  She described a pattern of decompensation precipitated by a doubling of her work load over the past 5 months in addition to a recent promotion  3 weeks ago where she was staying up all night to try to catch up on missed emails and phone calls.  This week she was facing a potential reprimand at work because she was unable to fulfill her duties and upon the advice of her uncle, agreeing to get evaluated.   Additionally her picture is further complicated by comorbid moderate alcohol use disorder. Collateral reports noted that her behavior is not typical for the last month, and they believe she has had increasing alcohol use especially in the last week prior to hospitalization as she was found to be intoxicated in the morning by family. Increased cannabis use may also be contributing.  This is the patient's first psychiatric hospitalization.       Given her presentation there was concern for substance-induced mood changes and/or bipolar disorder, with current episode hypomania/roselia the most likely  diagnosis at this time.  On evaluation there were no signs of overt psychosis, patient did not appear to be responding to internal stimuli, and patient discussed paranoia in the context of her job and not being able to fulfill her work duties as well as stress due to recent election results.  It was unclear if collateral reports of disorganization and paranoia were secondary to a budding primary psychotic disorder or in the context of hypo/roselia and further evaluation would be needed to discern.         Psychiatric Hospital Course:     Maria Del Carmen Sheikh was admitted to [facility] as a voluntary patient.    Legal  Signed in voluntarily (11/9)      Medications:  PCP had prescribed Lexapro 10 mg a few days before admission. HELD PTA Lexapro 10 mg given recent manic episode.   MSSA protocol was initiated on admission. But no PRNs required, MSSA scores have been 1-3. On 11/11, MSSA protocol discontinued.  11/11 - Started lithium 300 mg as mood stabilizer given manic symptoms prior to hospitalization and risperidone 0.5 mg at bedtime.   11/12 - Increased Lithium to 600 mg at bedtime  11/14: Increase naltrexone to 50 mg and risperidone to 1 mg  11/18 Increase Lithium to 750 mg   The risks, benefits, alternatives, and side effects were discussed and understood by the patient.   Level of medication adherence:  Behaviors: The patient was safe and appropriate and did not require chemical/physical restraints during admission. She was cooperative with cares, had good group attendance, and was visible in the milieu.  Change in psychiatric symptoms: Over the course of this hospitalization the patient's symptoms of  improved.  Collateral information was obtained from her brother, Scar, who lives in [place]. He stated that the patient was excessively drinking, up to 6 drinks per day prior to admission, and patient was exhibiting increased energy, decreased need for sleep, as well as disorganized thoughts and behaviors  Maria Del Carmen worthy  "released to  [facility] treatment  . At the time of discharge she was determined to not be a danger to herself or others.     Risk Assessment:      Today Maria Del Carmen Sheikh denies any suicidal or homicidal ideation. Patient has notable risk factors for self-harm, including anxiety, psychosis, and substance abuse. However, risk is mitigated by commitment to family, absence of past attempts, ability to volunteer a safety plan, and history of seeking help when needed. Therefore, based on all available evidence including the factors cited above, she does not appear to be at imminent risk for self-harm, does not meet criteria for a 72-hr hold, and therefore remains appropriate for ongoing outpatient level of care. Additional steps taken to minimize risk include: medication optimization, close psychiatric follow up and provision of crisis resources . Voluntary referral for chemical dependency treatment was offered, she accepted this offer.     Psychiatric Examination:     Oriented to:  Grossly Oriented  General:  Awake and Alert  Appearance:  appears stated age  Behavior/Attitude:  Calm and Cooperative  Eye Contact: Appropriate  Psychomotor: Tics - eyes roll upward; no catatonia present  Speech:  appropriate volume/tone  Language: Fluent in English with appropriate syntax and vocabulary.  Mood:  \"good\"  Affect:  appropriate and congruent with mood  Thought Process:  linear and coherent  Thought Content:   No SI/HI/AH/VH; No apparent delusions  Associations:  intact  Insight:  partial due to mood episode  Judgment:  fair due to recognition of mental illness  Impulse control: good  Attention Span:  grossly intact  Concentration:  grossly intact  Recent and Remote Memory:  not formally assessed  Fund of Knowledge: average  Muscle Strength and Tone: normal  Gait and Station: Normal        Medical Hospital Course:   Maria Del Carmen Sheikh was medically cleared by the ED prior to admission to the unit.     Medical Diagnoses addressed:  # " Mildly elevated liver enzymes  - Suspect due to alcohol use  - Avoid Acetaminophen for 72 hours   - PCP follow up      # Mild abdominal pain  - attributed to menstrual cramps though bleeding has mostly resolved  - may be related to excessive alcohol use   - Continue to monitor      # Vitamin D deficiency   Vitamin D (11/9) 17 on admission   - Vitamin D3 capsule 125 mcg daily      Medical course: Patient was physically examined by the ED prior to being transferred to the unit and was found to be medically stable and appropriate for admission.      Consults:  none    Labs were notable for the following:  - Pertinent Labs/Monitoring:   - QTc (11/11) 416 msec  - Treponema Ab nonreactive, ESR wnl, JAY negative, HIV nonreactive      Discharge Medications:     Current Discharge Medication List        START taking these medications    Details   cholecalciferol (VITAMIN D3) 125 mcg (5000 units) capsule Take 1 capsule (125 mcg) by mouth daily.  Qty: 30 capsule, Refills: 0    Associated Diagnoses: Bipolar I disorder, most recent episode (or current) manic (H)      folic acid (FOLVITE) 1 MG tablet Take 1 tablet (1 mg) by mouth daily.  Qty: 30 tablet, Refills: 0    Associated Diagnoses: Alcohol use disorder      lithium (ESKALITH) 150 MG capsule Take 1 capsule (150 mg) by mouth at bedtime.  Qty: 30 capsule, Refills: 0    Comments: Take one 150 mg tablet with two  300 mg capsules all together at bedtime.  Associated Diagnoses: Bipolar I disorder, most recent episode (or current) manic (H)      lithium ER (LITHOBID) 300 MG CR tablet Take 2 tablets (600 mg) by mouth at bedtime.  Qty: 60 tablet, Refills: 0    Comments: Take two tablets of  mg and one tablet  mg all together at bedtime.  Associated Diagnoses: Bipolar I disorder, most recent episode (or current) manic (H)      multivitamin w/minerals (THERA-VIT-M) tablet Take 1 tablet by mouth daily.  Qty: 30 tablet, Refills: 0    Associated Diagnoses: Alcohol use disorder       naltrexone (DEPADE/REVIA) 50 MG tablet Take 1 tablet (50 mg) by mouth daily.  Qty: 30 tablet, Refills: 0    Associated Diagnoses: Alcohol use disorder      risperiDONE (RISPERDAL) 1 MG tablet Take 1 tablet (1 mg) by mouth at bedtime.  Qty: 30 tablet, Refills: 0    Associated Diagnoses: Bipolar I disorder, most recent episode (or current) manic (H)      thiamine (B-1) 100 MG tablet Take 1 tablet (100 mg) by mouth daily.  Qty: 30 tablet, Refills: 0    Associated Diagnoses: Alcohol use disorder           STOP taking these medications       escitalopram (LEXAPRO) 10 MG tablet Comments:   Reason for Stopping:         hydrOXYzine HCl (ATARAX) 25 MG tablet Comments:   Reason for Stopping:                Discharge Plan:   Medications as above    Health Care Follow-up:     Appointment: Therapy  Date/time: Monday December 9th, 2024 @ 2:00 pm In person  Provider:  Tiera Darling PsyD   Address: [facility], [address], [facility] [address], [place] [address]  Phone: [phone]  Fax:  [phone]  Note:   You should be prepared to provide your insurance card and identification card at your first appointment. I will send you the intake forms/paperwork via my electronic health record ([facility]) before the appointment for you to review and sign. We can also do it in the office if needed at the start of the appointment. Please feel free to reach out with any questions.     Appointment: Psychiatry   Date/time: Wednesday December 11th, 2024 @ 10:15 am In person  Provider:  Deo Irene MS, CNP,RN  Address:[facility], [address], [address], [place] [id number]  Phone: [phone]  Fax: [phone]  Note:   Once scheduled, you will receive an email from [email or web address] with information on what paperwork needs to be completed prior to the appointment along with directions to the clinic! If you have any questions regarding  your appointment or the paperwork, please call [phone] for assistance. Please visit [email or web address] for more information about our clinic.  Medical follow up:   OB/Gyn: Anastacia Carrillo APRN CNP    Community Health Worker: Lexii Mustafa CHW   Family Medicine: Ghazal Sena PA-C - [facility]      Attestations:     Med Student:   HUMPHREY Munoz  Visiting Medical Student      Erlinda Daley MD  PGY1 Psychiatry Resident

## 2025-05-17 ENCOUNTER — HEALTH MAINTENANCE LETTER (OUTPATIENT)
Age: 35
End: 2025-05-17